# Patient Record
Sex: FEMALE | Race: WHITE | NOT HISPANIC OR LATINO | Employment: OTHER | URBAN - METROPOLITAN AREA
[De-identification: names, ages, dates, MRNs, and addresses within clinical notes are randomized per-mention and may not be internally consistent; named-entity substitution may affect disease eponyms.]

---

## 2017-01-04 ENCOUNTER — RADIANT APPOINTMENT (OUTPATIENT)
Dept: GENERAL RADIOLOGY | Facility: CLINIC | Age: 72
End: 2017-01-04
Attending: FAMILY MEDICINE
Payer: COMMERCIAL

## 2017-01-04 ENCOUNTER — RADIANT APPOINTMENT (OUTPATIENT)
Dept: ULTRASOUND IMAGING | Facility: CLINIC | Age: 72
End: 2017-01-04
Attending: FAMILY MEDICINE
Payer: COMMERCIAL

## 2017-01-04 ENCOUNTER — OFFICE VISIT (OUTPATIENT)
Dept: FAMILY MEDICINE | Facility: CLINIC | Age: 72
End: 2017-01-04
Payer: COMMERCIAL

## 2017-01-04 VITALS
HEIGHT: 68 IN | HEART RATE: 52 BPM | DIASTOLIC BLOOD PRESSURE: 80 MMHG | BODY MASS INDEX: 26.52 KG/M2 | WEIGHT: 175 LBS | OXYGEN SATURATION: 97 % | SYSTOLIC BLOOD PRESSURE: 128 MMHG | RESPIRATION RATE: 19 BRPM | TEMPERATURE: 98 F

## 2017-01-04 DIAGNOSIS — M25.561 RIGHT KNEE PAIN, UNSPECIFIED CHRONICITY: ICD-10-CM

## 2017-01-04 DIAGNOSIS — M25.561 CHRONIC PAIN OF BOTH KNEES: ICD-10-CM

## 2017-01-04 DIAGNOSIS — M25.551 HIP PAIN, RIGHT: ICD-10-CM

## 2017-01-04 DIAGNOSIS — G89.29 CHRONIC PAIN OF BOTH KNEES: ICD-10-CM

## 2017-01-04 DIAGNOSIS — M25.562 CHRONIC PAIN OF BOTH KNEES: ICD-10-CM

## 2017-01-04 DIAGNOSIS — M25.561 RIGHT KNEE PAIN, UNSPECIFIED CHRONICITY: Primary | ICD-10-CM

## 2017-01-04 PROCEDURE — 99213 OFFICE O/P EST LOW 20 MIN: CPT | Performed by: FAMILY MEDICINE

## 2017-01-04 PROCEDURE — 73562 X-RAY EXAM OF KNEE 3: CPT | Mod: LT | Performed by: RADIOLOGY

## 2017-01-04 PROCEDURE — 93971 EXTREMITY STUDY: CPT | Mod: RT

## 2017-01-04 PROCEDURE — 73502 X-RAY EXAM HIP UNI 2-3 VIEWS: CPT | Performed by: RADIOLOGY

## 2017-01-04 RX ORDER — BACLOFEN 10 MG/1
10 TABLET ORAL 3 TIMES DAILY
Refills: 3 | COMMUNITY
Start: 2016-12-12 | End: 2018-10-11

## 2017-01-04 ASSESSMENT — PAIN SCALES - GENERAL: PAINLEVEL: SEVERE PAIN (6)

## 2017-01-04 NOTE — PROGRESS NOTES
SUBJECTIVE:                                                    Zayra iDop is a 71 year old female who presents to clinic today for the following health issues:    Joint Pain     Onset: October     Description:   Location: bilateral knees, right hurts worse  Character: Sharp and Dull ache    Intensity: moderate    Progression of Symptoms: worse    Accompanying Signs & Symptoms:  Other symptoms: radiation of pain to upper thigh   History:   Previous similar pain: YES      Precipitating factors:   Trauma or overuse: YES- fell up the stairs on the right knee    Alleviating factors:  Improved by: nothing       Therapies Tried and outcome: ibuprofen, tylenol          Problem list and histories reviewed & adjusted, as indicated.  Additional history: as documented      Zayra believes she has arthitis in her knee. She typically has knee pain in the Spring and Fall due to weather changes, however this pain has persisted with the right knee being worse than the left. She also notes she fell up the stairs up the stairs in November and hurt her right knee. She sat down and elevated her knee immediately as she was unable to walk due to the pain initially. Currently,the pain is also present above her knee and behind her knee. She has occasional hip pain and pain that radiates down her right leg.    Denies back pain, numbness and tingling of her leg, additional falls, family hx of blood clots. She has occasional weakness in her knee but has not fallen.     Additional Notes  -She has a cold. She saw a GI specialist at [facility] and was told she does not have acidic GERD. She was given Zantac for mild GERD. She was recently started on Baclofen for relaxation of the esophagus and is unsure if it is helping.   -She has had bursitis in her shoulder.           Patient Active Problem List   Diagnosis     Health Care Home     Breast cancer (H)     Family history of thyroid disease     Hyperlipidemia LDL goal <130     Vitamin D deficiency  "disease     Advance care planning     Chronic cough     Past Surgical History   Procedure Laterality Date     Appendectomy  1969     Lumpectomy breast  1999     right     Mastectomy  6/2010     left     Laparoscopy  1972     for infertility     Cataract iol, rt/lt       laser treatment following       Social History   Substance Use Topics     Smoking status: Never Smoker      Smokeless tobacco: Never Used     Alcohol Use: Yes      Comment: social, 2 drinks/week     Family History   Problem Relation Age of Onset     Breast Cancer Sister      Breast Cancer Sister      HEART DISEASE Sister 74     stents     Alzheimer Disease Father      Kidney Cancer Brother      Kidney     CANCER Mother      Hodgkins     Cardiovascular Brother      has Heart problems had a TIA          Current Outpatient Prescriptions   Medication Sig Dispense Refill     baclofen (LIORESAL) 10 MG tablet Take 10 mg by mouth 3 times daily  3     ranitidine (ZANTAC) 150 MG tablet Take 150 mg by mouth daily       Red Yeast Rice Extract (RED YEAST RICE PO)        megestrol (MEGACE) 20 MG tablet 1-3 tablets orally daily 100 tablet 12     Cholecalciferol (VITAMIN D) 2000 UNITS tablet Take 1 tablet by mouth At Bedtime.       Cholecalciferol (VITAMIN D-3) 5000 UNITS TABS Take  by mouth daily.       CALCIUM-MAGNESIUM-ZINC PO Take  by mouth. Calcium = 333 MG, D3 = 200 MG, Mag - 133 MG, Zinc = 5 MG       No Known Allergies    ROS:  Constitutional, HEENT, cardiovascular, pulmonary, gi and gu systems are negative, except as otherwise noted.    OBJECTIVE:                                                    /80 mmHg  Pulse 52  Temp(Src) 98  F (36.7  C) (Oral)  Resp 19  Ht 1.727 m (5' 8\")  Wt 79.379 kg (175 lb)  BMI 26.61 kg/m2  SpO2 97%  LMP 11/10/1986  Breastfeeding? No  Body mass index is 26.61 kg/(m^2).     GENERAL: healthy, alert and no distress  MS: Right Knee-No joint line tenderness, tender posterior popliteal space, no patellar apprehension, " Suresh neg. Anterior drawer-neg. Full ROM.    Hip-tender over trochanteric bursa,Full ROM but pain with internal rotation(this is replicating her outer leg pain).   NEURO: Normal gait, heel and tiptoe walking.   PSYCH: mentation appears normal, affect normal/bright        Diagnostic Test Results:  none      ASSESSMENT/PLAN:                                                        ICD-10-CM    1. Right knee pain, unspecified chronicity M25.561 XR Hip Right 2-3 Views     US Lower Extremity Venous Duplex Right   2. Chronic pain of both knees M25.561 XR Knee Bilateral 3 Views    M25.562     G89.29    3. Hip pain, right M25.551    Will start workup with ultrasound of RLE to rule out DVT and Baker's cyst. Will get X-rays of knees and hip. Suspect OA of both knees and hip along with trochanteric bursitis causing her sx's. Depending on imaging results will likely plan for PT and starting glucosamine. Consider also ortho referral for possible injection. Patient sent directly from the office to radiology for the u/s and imaging.     See Patient Instructions  Patient Instructions   Follow up for ultrasound.     Try Glucosamine for 2 months. If it is helping, continue long-term. If not effective, discontinue.           This document serves as a record of the services and decisions personally performed and made by Precious Blackwood MD. It was created on her behalf by Aisha Davis,a trained medical scribe. The creation of this document is based the provider's statements to the medical scribe.  Aisha Davis January 4, 2017 3:30 PM     Precious Blackwood MD  [facility]

## 2017-01-04 NOTE — PATIENT INSTRUCTIONS
Follow up for ultrasound.     Try Glucosamine for 2 months. If it is helping, continue long-term. If not effective, discontinue.

## 2017-01-04 NOTE — MR AVS SNAPSHOT
After Visit Summary   1/4/2017    Zayra Diop    MRN: [id number]           Patient Information     Date Of Birth          1945        Visit Information        Provider Department      1/4/2017 3:00 PM Precious Blackwood MD [facility]        Today's Diagnoses     Right knee pain, unspecified chronicity    -  1     Chronic pain of both knees         Hip pain, right           Care Instructions    Follow up for ultrasound.     Try Glucosamine for 2 months. If it is helping, continue long-term. If not effective, discontinue.           Follow-ups after your visit        Your next 10 appointments already scheduled     Jan 04, 2017  4:30 PM   US LOWER EXTREMITY VENOUS DUPLEX RIGHT with MGUS1, MG US TECH   [facility])    [id number] [place] [id number] [phone]           Please bring a list of your medicines (including vitamins, minerals and over-the-counter drugs). Also, tell your doctor about any allergies you may have. Wear comfortable clothes and leave your valuables at home.  You do not need to do anything special to prepare for your exam.  Please call the Imaging Department at your exam site with any questions.            Jan 04, 2017  5:00 PM   XR HIP RIGHT G/E 2 VIEWS with MGXR2   [facility])    [address] [place] [id number] [phone]           Please bring a list of your current medicines to your exam. (Include vitamins, minerals and over-thecounter medicines.) Leave your valuables at home.  Tell your doctor if there is a chance you may be pregnant.  You do not need to do anything special for this exam.            Jan 04, 2017  5:20 PM   XR KNEE BILATERAL 3 VIEWS with MGXR2   [facility])    [address] [address] [address] [phone]           Please bring a list of  your current medicines to your exam. (Include vitamins, minerals and over-thecounter medicines.) Leave your valuables at home.  Tell your doctor if there is a chance you may be pregnant.  You do not need to do anything special for this exam.              Future tests that were ordered for you today     Open Future Orders        Priority Expected Expires Ordered    US Lower Extremity Venous Duplex Right STAT  1/4/2018 1/4/2017    XR Hip Right 2-3 Views Routine 1/4/2017 1/4/2018 1/4/2017    XR Knee Bilateral 3 Views Routine 1/4/2017 1/4/2018 1/4/2017            Who to contact     If you have questions or need follow up information about today's clinic visit or your schedule please contact [facility] directly at [phone].  Normal or non-critical lab and imaging results will be communicated to you by MyChart, letter or phone within 4 business days after the clinic has received the results. If you do not hear from us within 7 days, please contact the clinic through shoplyhart or phone. If you have a critical or abnormal lab result, we will notify you by phone as soon as possible.  Submit refill requests through [facility] or call your pharmacy and they will forward the refill request to us. Please allow 3 business days for your refill to be completed.          Additional Information About Your Visit        shoplyhar[facility]arma Information     [facility] gives you secure access to your electronic health record. If you see a primary care provider, you can also send messages to your care team and make appointments. If you have questions, please call your primary care clinic.  If you do not have a primary care provider, please call [phone] and they will assist you.        Care EveryWhere ID     This is your Care EveryWhere ID. This could be used by other organizations to access your [facility] medical records  [id number]        Your Vitals Were     Pulse Temperature Respirations    52 98  F (36.7  C) (Oral) 19    Height  "BMI (Body Mass Index) Pulse Oximetry    5' 8\" (1.727 m) 26.61 kg/m2 97%    Last Period Breastfeeding?       11/10/1986 No        Blood Pressure from Last 3 Encounters:   01/04/17 128/80   04/26/16 162/87   03/24/16 171/93    Weight from Last 3 Encounters:   01/04/17 175 lb (79.379 kg)   03/24/16 190 lb 8 oz (86.41 kg)   03/01/16 192 lb 1.6 oz (87.136 kg)               Primary Care Provider Office Phone # Fax #    Precious Juliann Blackwood [phone]       [facility] [address] [place] [address] [address]  [facility] [address]        Thank you!     Thank you for choosing [facility]  for your care. Our goal is always to provide you with excellent care. Hearing back from our patients is one way we can continue to improve our services. Please take a few minutes to complete the written survey that you may receive in the mail after your visit with us. Thank you!             Your Updated Medication List - Protect others around you: Learn how to safely use, store and throw away your medicines at www.disposemymeds.org.          This list is accurate as of: 1/4/17  3:46 PM.  Always use your most recent med list.                   Brand Name Dispense Instructions for use    baclofen 10 MG tablet    LIORESAL     Take 10 mg by mouth 3 times daily       CALCIUM-MAGNESIUM-ZINC PO      Take  by mouth. Calcium = 333 MG, D3 = 200 MG, Mag - 133 MG, Zinc = 5 MG       megestrol 20 MG tablet    MEGACE    100 tablet    1-3 tablets orally daily       ranitidine 150 MG tablet    ZANTAC     Take 150 mg by mouth daily       RED YEAST RICE PO          * Vitamin D-3 5000 UNITS Tabs      Take  by mouth daily.       * vitamin D 2000 UNITS tablet      Take 1 tablet by mouth At Bedtime.       * Notice:  This list has 2 medication(s) that are the same as other medications prescribed for you. Read the directions carefully, and ask your doctor or other care provider to review them with you.      "

## 2017-01-04 NOTE — NURSING NOTE
"Chief Complaint   Patient presents with     Knee Pain       Initial /80 mmHg  Pulse 52  Temp(Src) 98  F (36.7  C) (Oral)  Resp 19  Ht 5' 8\" (1.727 m)  Wt 175 lb (79.379 kg)  BMI 26.61 kg/m2  SpO2 97%  LMP 11/10/1986  Breastfeeding? No Estimated body mass index is 26.61 kg/(m^2) as calculated from the following:    Height as of this encounter: 5' 8\" (1.727 m).    Weight as of this encounter: 175 lb (79.379 kg).  BP completed using cuff size: large    Gemini Miles MA       "

## 2017-01-05 ENCOUNTER — MYC MEDICAL ADVICE (OUTPATIENT)
Dept: FAMILY MEDICINE | Facility: CLINIC | Age: 72
End: 2017-01-05

## 2017-01-05 DIAGNOSIS — M25.551 HIP PAIN, RIGHT: ICD-10-CM

## 2017-01-05 DIAGNOSIS — M25.561 RIGHT KNEE PAIN, UNSPECIFIED CHRONICITY: Primary | ICD-10-CM

## 2017-01-09 ENCOUNTER — OFFICE VISIT (OUTPATIENT)
Dept: ORTHOPEDICS | Facility: CLINIC | Age: 72
End: 2017-01-09
Payer: COMMERCIAL

## 2017-01-09 VITALS
HEIGHT: 68 IN | HEART RATE: 58 BPM | OXYGEN SATURATION: 95 % | WEIGHT: 174 LBS | DIASTOLIC BLOOD PRESSURE: 70 MMHG | BODY MASS INDEX: 26.37 KG/M2 | SYSTOLIC BLOOD PRESSURE: 122 MMHG

## 2017-01-09 DIAGNOSIS — M76.891 ENTHESOPATHY OF RIGHT HIP REGION: Primary | ICD-10-CM

## 2017-01-09 DIAGNOSIS — M71.21: ICD-10-CM

## 2017-01-09 PROCEDURE — 20611 DRAIN/INJ JOINT/BURSA W/US: CPT | Mod: RT | Performed by: FAMILY MEDICINE

## 2017-01-09 PROCEDURE — 99214 OFFICE O/P EST MOD 30 MIN: CPT | Mod: 25 | Performed by: FAMILY MEDICINE

## 2017-01-09 ASSESSMENT — PAIN SCALES - GENERAL: PAINLEVEL: SEVERE PAIN (7)

## 2017-01-09 NOTE — PROGRESS NOTES
HISTORY OF PRESENT ILLNESS  Ms. Diop is a pleasant 71 year old year old female who presents to clinic today with right knee and hip pain.  She was referred by Dr. Yoko Iverson explains that her pain started after she fell up the stairs in November of this year. Points to her lateral right hip, shoots down to her knee.  Her right hip hurts on the lateral aspect, hurts with most motion.  Also hurts whnen getting up from a seated position.  She also has right knee pain that would wax and wane for some time, became markedly painful a couple months ago and won't go away.  Juliann had hip and knee xrays done last week.  This morning Zayra felt her toes go tingly and numb while lying down in bed.    Quality:  achy pain    Severity: moderate to severe  Timing: occurs intermittently  Associated signs & symptoms: none  Previous similar pain: no  Additional history: as documented    MEDICAL HISTORY  Patient Active Problem List   Diagnosis     Health Care Home     Breast cancer (H)     Family history of thyroid disease     Hyperlipidemia LDL goal <130     Vitamin D deficiency disease     Advance care planning     Chronic cough       Current Outpatient Prescriptions   Medication Sig Dispense Refill     baclofen (LIORESAL) 10 MG tablet Take 10 mg by mouth 3 times daily  3     ranitidine (ZANTAC) 150 MG tablet Take 150 mg by mouth daily       Red Yeast Rice Extract (RED YEAST RICE PO)        megestrol (MEGACE) 20 MG tablet 1-3 tablets orally daily 100 tablet 12     Cholecalciferol (VITAMIN D) 2000 UNITS tablet Take 1 tablet by mouth At Bedtime.       Cholecalciferol (VITAMIN D-3) 5000 UNITS TABS Take  by mouth daily.       CALCIUM-MAGNESIUM-ZINC PO Take  by mouth. Calcium = 333 MG, D3 = 200 MG, Mag - 133 MG, Zinc = 5 MG         No Known Allergies    Family History   Problem Relation Age of Onset     Breast Cancer Sister      Breast Cancer Sister      HEART DISEASE Sister 74     stents     Alzheimer Disease Father      Kidney  "Cancer Brother      Kidney     CANCER Mother      Hodgkins     Cardiovascular Brother      has Heart problems had a TIA        Additional medical/Social/Surgical histories reviewed in [facility] and updated as appropriate.     REVIEW OF SYSTEMS (1/9/2017)  10 point ROS of systems including Constitutional, Eyes, Respiratory, Cardiovascular, Gastroenterology, Genitourinary, Integumentary, Musculoskeletal, Psychiatric were all negative except for pertinent positives noted in my HPI.     PHYSICAL EXAM  Filed Vitals:    01/09/17 1017   BP: 122/70   Pulse: 58   Height: 1.727 m (5' 8\")   Weight: 78.926 kg (174 lb)   SpO2: 95%     General  - normal appearance, in no obvious distress  CV  - normal femoral pulse  Pulm  - normal respiratory pattern, non-labored  Musculoskeletal - right hip  - stance: normal gait without limp, no obvious leg length discrepancy  - inspection: no swelling or effusion,  normal bone and joint alignment, no obvious deformity  - palpation: tender over the greater trochanter  - ROM: pain with active abduction, normal and painless flexion, extension, IR, ER, adduction  - strength: 5/5 in all planes  - special tests:  (-) UMA  (-) FADIR  no pain with axial femoral load  Neuro  - no sensory or motor deficit, grossly normal coordination, normal muscle tone  Skin  - no ecchymosis, erythema, warmth, or induration, no obvious rash  Psych  - interactive, appropriate, normal mood and affect        ASSESSMENT & PLAN  Ms. Diop is a 71 year old year old female who is in the office today with right knee and hip pain.    I reviewed her hip and knee films in the room with her which read:    Impression:  1. No acute osseous abnormality identified.  2. Degenerative changes of right hip with preservation of joint space.  Degenerative changes of SI joint.  3. Enthesopathy of pelvis and trochanters.    4. Right hip CAM deformity.    5. Slight lateral subluxation of bilateral patella relative to lateral  femoral " trochlear.  6. Question of lateral femorotibial joint space narrowing of right  knee only appreciable on lateral view.    Juliann's pain may be related to trochanteric enthesopathy or lumbar radiculopathy.    I injected her right greater trochanteric bursa today (see procedure note) as a diagnostic / therapeutic injection.  I also referred her to PT.    Her right knee pain is less bothersome and likely secondary to popliteal cysts that were found on her recent ultrasound exam.  We discussed the pathophysiology of these and some common treatment strategies.  I recommend that Juliann wear a knee sleeve with exercise and monitor her pain.    I recommend that Zayra return to my office for a re-examination in 4-6 weeks.  If no improvement I'll obtain imaging of her lumbar spine.    It was a pleasure taking care of Zayra.        Steven Gilbert DO, [facility]        Trochanteric Hip Injection - Ultrasound Guided  The patient was informed of the risks and the benefits of the procedure and a written consent was signed.  The patient s right lateral hip was prepped with chlorhexidine in sterile fashion.   40 mg of triamcinolone suspension was drawn up into a 5 mL syringe with 2 mL of 1% lidocaine and 2 mL of 0.5% marcaine.  Injection was performed using sterile technique.  Under ultrasound guidance a 3.5-inch 25-gauge needle was used to enter the right javier-trochanteric tissue.  Needle placement was visualized and documented with ultrasound which was deemed necessary to ensure medication did not enter the tendon itself, which could potentially cause tendon damage.   Injection performed long axis to the probe with probe in short axis to the greater trochanter.  Expansion of the javier-trochanteric tissue was visualized under ultrasound upon injection.  Images were permanently stored for the patient's record.  There were no complications. The patient tolerated the procedure well. There was negligible bleeding.   The patient was instructed to ice  the hip upon leaving clinic and refrain from overuse over the next 3 days.   The patient was instructed to call or go to the emergency room with any unusual pain, swelling, redness, or if otherwise concerned.  A follow up appointment will be scheduled to evaluate response to the injection, and to assess range of motion and pain.  Kenalog: NDC [id number]

## 2017-01-09 NOTE — PATIENT INSTRUCTIONS
Thanks for coming today.  Ortho[facility] Medicine Clinic  [address] [place] [address]    To schedule future appointments in Ortho Clinic, you may call [phone].    To schedule ordered imaging by your provider:   Call Central Imaging [phone]    To schedule an injection ordered by your provider:  Call Central Imaging Injection scheduling line: [phone]  [email or web address] available online at:  [email or web address]    Please call if any further questions or concerns ([phone]).  Clinic hours 8 am to 5 pm.    Return to clinic (call) if symptoms worsen or fail to improve.

## 2017-01-09 NOTE — NURSING NOTE
"Zayra Diop's goals for this visit include: Find a solution for right hip and right knee pain.   She requests these members of her care team be copied on today's visit information: yes    PCP: Precious Blackwood    Referring Provider:  No referring provider defined for this encounter.    Chief Complaint   Patient presents with     Consult     Patient fell up the stairs in November 2016. Pain never subsided.      Knee right     Hip right       Initial /70 mmHg  Pulse 58  Ht 1.727 m (5' 8\")  Wt 78.926 kg (174 lb)  BMI 26.46 kg/m2  SpO2 95%  LMP 11/10/1986 Estimated body mass index is 26.46 kg/(m^2) as calculated from the following:    Height as of this encounter: 1.727 m (5' 8\").    Weight as of this encounter: 78.926 kg (174 lb).  BP completed using cuff size: regular    "

## 2017-01-18 ENCOUNTER — THERAPY VISIT (OUTPATIENT)
Dept: PHYSICAL THERAPY | Facility: CLINIC | Age: 72
End: 2017-01-18
Payer: COMMERCIAL

## 2017-01-18 DIAGNOSIS — M70.61 TROCHANTERIC BURSITIS OF RIGHT HIP: Primary | ICD-10-CM

## 2017-01-18 PROCEDURE — 97161 PT EVAL LOW COMPLEX 20 MIN: CPT | Mod: GP | Performed by: PHYSICAL THERAPIST

## 2017-01-18 PROCEDURE — 97035 APP MDLTY 1+ULTRASOUND EA 15: CPT | Mod: GP | Performed by: PHYSICAL THERAPIST

## 2017-01-18 PROCEDURE — 97110 THERAPEUTIC EXERCISES: CPT | Mod: GP | Performed by: PHYSICAL THERAPIST

## 2017-01-18 NOTE — PROGRESS NOTES
Subjective:                                       Pertinent medical history includes:  Cancer.  Medical allergies: no.  Other surgeries include:  Cancer surgery.  Current medications:  Other.  Current occupation is Retired.                                  Objective:    System    Physical Exam    General     ROS    Assessment/Plan:

## 2017-01-18 NOTE — PROGRESS NOTES
[facility] Initial Evaluation    Subjective:    Zayra Diop is a 71 year old female with a right hip condition.  Condition occurred with:  A fall/slip (fell as ascending stairs).  Condition occurred: at home.  This is a new condition  November 2016.    Received injection to trochanteric bursa Jan 9, 2017 which was beneficial for a few days. .    Patient reports pain:  Lateral.  Radiates to:  Knee.  Pain is described as aching and is intermittent and reported as 3/10.   Pain is the same all the time.  Symptoms are exacerbated by lying on extremity and relieved by NSAID's (injection).  Since onset symptoms are gradually improving.  Special tests:  X-ray.  Previous treatment includes physical therapy (neck).  There was moderate improvement following previous treatment.  General health as reported by patient is good.                  Barriers include:  None as reported by the patient.    Red flags:  None as reported by the patient.                      Objective:    Standing Alignment:        Lumbar:  Normal  Pelvic:  Normal  Hip:  Normal        Gait:    Gait Type:  Normal       Non-Weight Bearing:  normal             Flexibility/Screens:   Positive screens:  Hip    Lower Extremity:  Decreased left lower extremity flexibility:Piriformis and Hamstrings    Decreased right lower extremity flexibility:  Piriformis and Hamstrings                                                 Hip Evaluation  Hip PROM:  Hip PROM:  Left Hip:    Normal  Right Hip:  Normal (Mild pain reported end range hip flexion)                          Hip Strength:  : Fair Glut Med. : Fair Glut Med.                          Hip Special Testing:      Right hip negative for the following special tests:  [place]on; Fadir/Labrum or SLR    Hip Palpation:      Right hip tenderness present at:  Greater Trachanter; Piriformis and Bursa  Functional Testing:  not assessed                         General     ROS    Assessment/Plan:      Patient  is a 71 year old female with right side hip complaints.    Patient has the following significant findings with corresponding treatment plan.                  Diagnosis 1:  Right trochanteric bursitis (suspect right piriformis syndrome also)      Pain -  hot/cold therapy, US, manual therapy, self management, education and home program  Decreased ROM/flexibility - manual therapy, therapeutic exercise, therapeutic activity and home program  Decreased strength - therapeutic exercise, therapeutic activities and home program  Inflammation - cold therapy, US and self management/home program  Impaired muscle performance - neuro re-education and home program  Decreased function - therapeutic activities and home program    Therapy Evaluation Codes:   1) History comprised of:   Personal factors that impact the plan of care:      None.    Comorbidity factors that impact the plan of care are:      None.     Medications impacting care: None.  2) Examination of Body Systems comprised of:   Body structures and functions that impact the plan of care:      Hip.   Activity limitations that impact the plan of care are:      Sleeping and Laying down.  3) Clinical presentation characteristics are:   Stable/Uncomplicated.  4) Decision-Making    Low complexity using standardized patient assessment instrument and/or measureable assessment of functional outcome.  Cumulative Therapy Evaluation is: Low complexity.    Previous and current functional limitations:  (See Goal Flow Sheet for this information)    Short term and Long term goals: (See Goal Flow Sheet for this information)     Communication ability:  Patient appears to be able to clearly communicate and understand verbal and written communication and follow directions correctly.  Treatment Explanation - The following has been discussed with the patient:   RX ordered/plan of care  Anticipated outcomes  Possible risks and side effects  This patient would benefit from PT intervention to  resume normal activities.   Rehab potential is good.    Frequency:  1 X week, once daily  Duration:  for 6 weeks  Discharge Plan:  Achieve all LTG.  Independent in home treatment program.  Return to previous functional level by discharge.  Reach maximal therapeutic benefit.    Please refer to the daily flowsheet for treatment today, total treatment time and time spent performing 1:1 timed codes.

## 2017-02-01 ENCOUNTER — THERAPY VISIT (OUTPATIENT)
Dept: PHYSICAL THERAPY | Facility: CLINIC | Age: 72
End: 2017-02-01
Payer: COMMERCIAL

## 2017-02-01 DIAGNOSIS — M70.61 TROCHANTERIC BURSITIS OF RIGHT HIP: Primary | ICD-10-CM

## 2017-02-01 PROCEDURE — 97140 MANUAL THERAPY 1/> REGIONS: CPT | Mod: GP | Performed by: PHYSICAL THERAPIST

## 2017-02-01 PROCEDURE — 97110 THERAPEUTIC EXERCISES: CPT | Mod: GP | Performed by: PHYSICAL THERAPIST

## 2017-02-01 PROCEDURE — 97035 APP MDLTY 1+ULTRASOUND EA 15: CPT | Mod: GP | Performed by: PHYSICAL THERAPIST

## 2017-02-08 ENCOUNTER — THERAPY VISIT (OUTPATIENT)
Dept: PHYSICAL THERAPY | Facility: CLINIC | Age: 72
End: 2017-02-08
Payer: COMMERCIAL

## 2017-02-08 DIAGNOSIS — M70.61 TROCHANTERIC BURSITIS OF RIGHT HIP: Primary | ICD-10-CM

## 2017-02-08 PROCEDURE — 97110 THERAPEUTIC EXERCISES: CPT | Mod: GP | Performed by: PHYSICAL THERAPIST

## 2017-02-08 PROCEDURE — 97140 MANUAL THERAPY 1/> REGIONS: CPT | Mod: GP | Performed by: PHYSICAL THERAPIST

## 2017-02-08 PROCEDURE — 97035 APP MDLTY 1+ULTRASOUND EA 15: CPT | Mod: GP | Performed by: PHYSICAL THERAPIST

## 2017-02-08 NOTE — PROGRESS NOTES
Subjective:    HPI                    Objective:    System    Physical Exam    General     ROS    Assessment/Plan:      PROGRESS  REPORT    Progress reporting period is from 1-18-17 to 2-8-17.       SUBJECTIVE  Subjective changes noted by patient: Zayra reports less pain right lateral hip. She is now able to ly on right side to sleep w/o difficulty. Cheif c/o piriformis tightness bilaterally.     Current pain level is: 0/10.     Previous pain level was: 6/10.   Changes in function:  Yes (See Goal flowsheet attached for changes in current functional level)  Adverse reaction to treatment or activity: None    OBJECTIVE  Changes noted in objective findings: Right hip ROM and strength WNL. Full painfree trunk ROM. Zayra reports greater TTP left piriformis  than right. Denies TTP right greater trochanter.       ASSESSMENT/PLAN  Updated problem list and treatment plan: Diagnosis 1:  Right Greater Trochanteric Bursitis  Pain -  US, manual therapy, self management, education and home program  Decreased strength - therapeutic exercise, therapeutic activities and home program  Inflammation - cold therapy, US and self management/home program  Impaired muscle performance - neuro re-education and home program  Decreased function - therapeutic activities and home program  STG/LTGs have been met or progress has been made towards goals:  Yes (See Goal flow sheet completed today.)  Assessment of Progress: The patient's condition is improving.  Patient is meeting short term goals and is progressing towards long term goals.  Self Management Plans:  Patient is independent in a home treatment program.  Patient is independent in self management of symptoms.  I have re-evaluated this patient and find that the nature, scope, duration and intensity of the therapy is appropriate for the medical condition of the patient.  Zayra continues to require the following intervention to meet STG and LTG's:  PT    Recommendations:  This patient would  benefit from continued therapy (will only schedule appt in next month if symptoms resurface).     Frequency:  1 X week, once daily  Duration:  for 2 weeks        Please refer to the daily flowsheet for treatment today, total treatment time and time spent performing 1:1 timed codes.

## 2017-03-28 PROBLEM — M70.61 TROCHANTERIC BURSITIS OF RIGHT HIP: Status: RESOLVED | Noted: 2017-01-18 | Resolved: 2017-03-28

## 2017-06-10 ENCOUNTER — HEALTH MAINTENANCE LETTER (OUTPATIENT)
Age: 72
End: 2017-06-10

## 2017-08-02 ENCOUNTER — TRANSFERRED RECORDS (OUTPATIENT)
Dept: HEALTH INFORMATION MANAGEMENT | Facility: CLINIC | Age: 72
End: 2017-08-02

## 2017-09-07 ENCOUNTER — OFFICE VISIT (OUTPATIENT)
Dept: ORTHOPEDICS | Facility: CLINIC | Age: 72
End: 2017-09-07
Payer: COMMERCIAL

## 2017-09-07 VITALS — HEART RATE: 63 BPM | SYSTOLIC BLOOD PRESSURE: 112 MMHG | DIASTOLIC BLOOD PRESSURE: 60 MMHG | OXYGEN SATURATION: 97 %

## 2017-09-07 DIAGNOSIS — G89.29 CHRONIC RIGHT SHOULDER PAIN: Primary | ICD-10-CM

## 2017-09-07 DIAGNOSIS — M17.11 PRIMARY OSTEOARTHRITIS OF RIGHT KNEE: ICD-10-CM

## 2017-09-07 DIAGNOSIS — M25.511 CHRONIC RIGHT SHOULDER PAIN: Primary | ICD-10-CM

## 2017-09-07 PROCEDURE — 20610 DRAIN/INJ JOINT/BURSA W/O US: CPT | Mod: RT | Performed by: FAMILY MEDICINE

## 2017-09-07 PROCEDURE — 99214 OFFICE O/P EST MOD 30 MIN: CPT | Mod: 25 | Performed by: FAMILY MEDICINE

## 2017-09-07 RX ORDER — TRIAMCINOLONE ACETONIDE 40 MG/ML
40 INJECTION, SUSPENSION INTRA-ARTICULAR; INTRAMUSCULAR ONCE
Qty: 1 ML | Refills: 0 | OUTPATIENT
Start: 2017-09-07 | End: 2017-09-07

## 2017-09-07 ASSESSMENT — PAIN SCALES - GENERAL: PAINLEVEL: SEVERE PAIN (6)

## 2017-09-07 NOTE — MR AVS SNAPSHOT
After Visit Summary   2017    Zayra Diop    MRN: [id number]           Patient Information     Date Of Birth          1945        Visit Information        Provider Department      2017 8:40 AM Steven Gilbert DO [facility]        Today's Diagnoses     Chronic right shoulder pain    -  1    Primary osteoarthritis of right knee          Care Instructions    Thanks for coming today.  Ortho/Sports Medicine Clinic  [address] [place] [address]    To schedule future appointments in Ortho Clinic, you may call [phone].    To schedule ordered imaging by your provider:   Call Central Imaging [phone]    To schedule an injection ordered by your provider:  Call Central Imaging Injection scheduling line: [phone]  [employer] available online at:  [email or web address]    Please call if any further questions or concerns ([phone]).  Clinic hours 8 am to 5 pm.    Return to clinic (call) if symptoms worsen or fail to improve.            Follow-ups after your visit        Your next 10 appointments already scheduled     Sep 21, 2017  8:40 AM CDT   Return Visit with Steven Gilbert DO   [facility] ([facility]    [address] [place] [id number] [phone]              Who to contact     If you have questions or need follow up information about today's clinic visit or your schedule please contact [facility] directly at [phone].  Normal or non-critical lab and imaging results will be communicated to you by MyChart, letter or phone within 4 business days after the clinic has received the results. If you do not hear from us within 7 days, please contact the clinic through [employer] or phone. If you have a critical or abnormal lab result, we will notify you by phone as soon as possible.  Submit refill requests through [employer] or call your pharmacy and they will forward  the refill request to us. Please allow 3 business days for your refill to be completed.          Additional Information About Your Visit        [employer] Information     [employer] gives you secure access to your electronic health record. If you see a primary care provider, you can also send messages to your care team and make appointments. If you have questions, please call your primary care clinic.  If you do not have a primary care provider, please call [phone] and they will assist you.      [employer] is an electronic gateway that provides easy, online access to your medical records. With [employer], you can request a clinic appointment, read your test results, renew a prescription or communicate with your care team.     To access your existing account, please contact your [facility] or call [phone] for assistance.        Care EveryWhere ID     This is your Care EveryWhere ID. This could be used by other organizations to access your [facility] medical records  [id number]        Your Vitals Were     Pulse Last Period Pulse Oximetry             63 11/10/1986 97%          Blood Pressure from Last 3 Encounters:   09/07/17 112/60   01/09/17 122/70   01/04/17 128/80    Weight from Last 3 Encounters:   01/09/17 78.9 kg (174 lb)   01/04/17 79.4 kg (175 lb)   03/24/16 86.4 kg (190 lb 8 oz)              We Performed the Following     DRAIN/INJECT LARGE JOINT/BURSA          Today's Medication Changes          These changes are accurate as of: 9/7/17  9:59 AM.  If you have any questions, ask your nurse or doctor.               Start taking these medicines.        Dose/Directions    triamcinolone acetonide 40 MG/ML injection   Commonly known as:  KENALOG   Used for:  Primary osteoarthritis of right knee        Dose:  40 mg   1 mL (40 mg) by INTRA-ARTICULAR route once for 1 dose   Quantity:  1 mL   Refills:  0            Where to get your medicines      Some of these will need a paper  prescription and others can be bought over the counter.  Ask your nurse if you have questions.     You don't need a prescription for these medications     triamcinolone acetonide 40 MG/ML injection                Primary Care Provider Office Phone # Fax #    Precious Blackwood [phone] [address] [facility] [place] [address]  [facility] [address]        Equal Access to Services     [facility] Hospital[facility] : Hadii aad ku hadasho Soomaali, waaxda luqadaha, qaybta kaalmada adeegyada, waxay idiin hayaan adeeg brianna la'aan . So [facility] [phone].    ATENCIÓN: Si guzmanla espjacinto, tiene a pham disposición servicios gratuitos de asistencia lingüística. Joe al [phone].    We comply with applicable federal civil rights laws and [place] laws. We do not discriminate on the basis of race, color, national origin, age, disability sex, sexual orientation or gender identity.            Thank you!     Thank you for choosing [facility]  for your care. Our goal is always to provide you with excellent care. Hearing back from our patients is one way we can continue to improve our services. Please take a few minutes to complete the written survey that you may receive in the mail after your visit with us. Thank you!             Your Updated Medication List - Protect others around you: Learn how to safely use, store and throw away your medicines at www.disposemymeds.org.          This list is accurate as of: 9/7/17  9:59 AM.  Always use your most recent med list.                   Brand Name Dispense Instructions for use Diagnosis    baclofen 10 MG tablet    LIORESAL     Take 10 mg by mouth 3 times daily        CALCIUM-MAGNESIUM-ZINC PO      Take  by mouth. Calcium = 333 MG, D3 = 200 MG, Mag - 133 MG, Zinc = 5 MG        megestrol 20 MG tablet    MEGACE    100 tablet    1-3 tablets orally daily    Hot flashes, Breast cancer (H)       ranitidine 150 MG tablet    ZANTAC     Take 150 mg by mouth daily        RED  YEAST RICE PO           triamcinolone acetonide 40 MG/ML injection    KENALOG    1 mL    1 mL (40 mg) by INTRA-ARTICULAR route once for 1 dose    Primary osteoarthritis of right knee       * Vitamin D-3 5000 UNITS Tabs      Take  by mouth daily.        * vitamin D 2000 UNITS tablet      Take 1 tablet by mouth At Bedtime.        * Notice:  This list has 2 medication(s) that are the same as other medications prescribed for you. Read the directions carefully, and ask your doctor or other care provider to review them with you.

## 2017-09-07 NOTE — PROGRESS NOTES
HISTORY OF PRESENT ILLNESS  Ms. Diop is a pleasant 71 year old year old female who presents to clinic today for follow up of her right hip pain.  Juliann's hip felt great until about 3 weeks ago.  Her knee is bothering her the most today.  I saw her for this in January.  This is getting worse.  Posterior and anterior knee, worse the more she's on her feet.   Also has right sided trapezius pain, present since she was in high school.  Tender to the touch, worse with palpation.  Doesn't shoot down the right arm, no numbness or tingling.  No weakness.  Quality: achy pain    Severity: moderate  Timing: occurs intermittently  Modifying factors: resting and non-use makes it better, movement and use makes it worse  Associated signs & symptoms: none  Previous similar pain: yes  Additional history: as documented      REVIEW OF SYSTEMS (9/7/2017)  10 point ROS of systems including Constitutional, Eyes, Respiratory, Cardiovascular, Gastroenterology, Genitourinary, Integumentary, Musculoskeletal, Psychiatric were all negative except for pertinent positives noted in my HPI.     PHYSICAL EXAM  Vitals:    09/07/17 0840   BP: 112/60   BP Location: Right arm   Patient Position: Chair   Cuff Size: Adult Regular   Pulse: 63   SpO2: 97%     General  - normal appearance, in no obvious distress  CV  - normal popliteal pulse  Pulm  - normal respiratory pattern, non-labored  Musculoskeletal - right knee  - stance: normal gait and station  - inspection: generalized swelling, posterior knee effusion  - palpation: medial joint line tenderness, patella and patellar tendon non-tender, normal popliteal pulse  - ROM: 135 degrees flexion, 0 degrees extension  - strength: 5/5 in flexion, 5/5 in extension  - neuro: no sensory or motor deficit  - special tests:  (-) Lachman  (-) varus at 0 and 30 degrees flexion  (-) valgus at 0 and 30 degrees flexion    Musculoskeletal - right shoulder  - inspection: normal bone and joint alignment, no obvious  deformity, no scapular winging, no AC step-off  - palpation: tender over right central to medial trapezius  - ROM:  180 deg flexion   45 deg extension   150 deg abduction   90 deg ER   70 deg IR  - strength: 5/5  strength, 5/5 in all shoulder planes  - special tests:  (-) Speed's  (-) Neer  (-) Hawkin's  (-) Shira  (-) [place]'s  (-) subscap lift-off    Neuro  - no sensory or motor deficit, grossly normal coordination, normal muscle tone  Skin  - no ecchymosis, erythema, warmth, or induration, no obvious rash  Psych  - interactive, appropriate, normal mood and affect            ASSESSMENT & PLAN  Ms. Diop is a 71 year old year old female who is in the office today following up with right hip pain.    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  I injected her knee today (see procedure note).    Juliann is going to follow up in 2 weeks, at that time we can consider a right trochanteric injection.    Her trapezius pain is likely muscular in nature, we discussed some home therapy she can do on her own.  She's going to let me know if this is a problem in the future.    It was a pleasure taking care of Greer Gilbert DO, CAM      PROCEDURE    Knee Injection - Intraarticular  The patient was informed of the risks and the benefits of the procedure and a written consent was signed.  The patient s right knee was prepped with chlorhexidine in sterile fashion.   40 mg of triamcinolone suspension was drawn up into a 5 mL syringe with 2 mL of 1% lidocaine and 2 mL of 0.5% marcaine.  Injection was performed using substerile technique.  A 1.5-inch 25-gauge needle was used to enter the lateral aspect of the right knee.  Injection performed successfully without difficulty.  There were no complications. The patient tolerated the procedure well. There was negligible bleeding.   The patient was instructed to ice the knee upon leaving clinic and refrain from overuse over the next 3 days.   The patient was  instructed to call or go to the emergency room with any unusual pain, swelling, redness, or if otherwise concerned.  A follow up appointment will be scheduled to evaluate response to the injection, and to assess range of motion and pain.  Kenalog: NDC 8970-1620-03

## 2017-09-07 NOTE — PATIENT INSTRUCTIONS
Thanks for coming today.  Ortho[facility]Sports Medicine Clinic  [address] [place] [address]    To schedule future appointments in Ortho Clinic, you may call [phone].    To schedule ordered imaging by your provider:   Call Central Imaging [phone]    To schedule an injection ordered by your provider:  Call Central Imaging Injection scheduling line: [phone]  [employer] available online at:  [email or web address]    Please call if any further questions or concerns ([phone]).  Clinic hours 8 am to 5 pm.    Return to clinic (call) if symptoms worsen or fail to improve.

## 2017-09-07 NOTE — NURSING NOTE
"Zayra Diop's goals for this visit include: Follow up with right hip, right knee and right shoulder pain.   She requests these members of her care team be copied on today's visit information: yes    PCP: Precious Blackwood    Referring Provider:  No referring provider defined for this encounter.    Chief Complaint   Patient presents with     RECHECK     Patient states that the pain is still the same     Knee right     Shoulder right       Initial /60 (BP Location: Right arm, Patient Position: Chair, Cuff Size: Adult Regular)  Pulse 63  LMP 11/10/1986  SpO2 97% Estimated body mass index is 26.46 kg/(m^2) as calculated from the following:    Height as of 1/9/17: 1.727 m (5' 8\").    Weight as of 1/9/17: 78.9 kg (174 lb).  Medication Reconciliation: complete    "

## 2017-09-21 ENCOUNTER — OFFICE VISIT (OUTPATIENT)
Dept: ORTHOPEDICS | Facility: CLINIC | Age: 72
End: 2017-09-21
Payer: COMMERCIAL

## 2017-09-21 VITALS — OXYGEN SATURATION: 97 % | SYSTOLIC BLOOD PRESSURE: 120 MMHG | HEART RATE: 54 BPM | DIASTOLIC BLOOD PRESSURE: 62 MMHG

## 2017-09-21 DIAGNOSIS — M67.959 TENDINOPATHY OF GLUTEAL REGION: Primary | ICD-10-CM

## 2017-09-21 PROCEDURE — 99213 OFFICE O/P EST LOW 20 MIN: CPT | Performed by: FAMILY MEDICINE

## 2017-09-21 ASSESSMENT — PAIN SCALES - GENERAL: PAINLEVEL: MODERATE PAIN (5)

## 2017-09-21 NOTE — MR AVS SNAPSHOT
After Visit Summary   2017    Zayra Diop    MRN: [id number]           Patient Information     Date Of Birth          1945        Visit Information        Provider Department      2017 8:40 AM Steven Gilbert, DO [facility]        Today's Diagnoses     Tendinopathy of gluteal region    -  1      Care Instructions    Thanks for coming today.  Ortho/Sports Medicine Clinic  [address] [place] [address]    To schedule future appointments in Ortho Clinic, you may call [phone].    To schedule ordered imaging by your provider:   Call Central Imaging [phone]    To schedule an injection ordered by your provider:  Call Central Imaging Injection scheduling line: [phone]  Grafflet available online at:  [email or web address]    Please call if any further questions or concerns ([phone]).  Clinic hours 8 am to 5 pm.    Return to clinic (call) if symptoms worsen or fail to improve.            Follow-ups after your visit        Additional Services     PHYSICAL THERAPY REFERRAL       Please eval and treat for right posterior hip pain                  Who to contact     If you have questions or need follow up information about today's clinic visit or your schedule please contact [facility] directly at [phone].  Normal or non-critical lab and imaging results will be communicated to you by MyChart, letter or phone within 4 business days after the clinic has received the results. If you do not hear from us within 7 days, please contact the clinic through MyChart or phone. If you have a critical or abnormal lab result, we will notify you by phone as soon as possible.  Submit refill requests through [employer] or call your pharmacy and they will forward the refill request to us. Please allow 3 business days for your refill to be completed.          Additional Information About Your Visit        MyChart Information      [employer] gives you secure access to your electronic health record. If you see a primary care provider, you can also send messages to your care team and make appointments. If you have questions, please call your primary care clinic.  If you do not have a primary care provider, please call [phone] and they will assist you.      [employer] is an electronic gateway that provides easy, online access to your medical records. With [employer], you can request a clinic appointment, read your test results, renew a prescription or communicate with your care team.     To access your existing account, please contact your [facility] or call [phone] for assistance.        Care EveryWhere ID     This is your Care EveryWhere ID. This could be used by other organizations to access your [facility] medical records  [id number]        Your Vitals Were     Pulse Last Period Pulse Oximetry             54 11/10/1986 97%          Blood Pressure from Last 3 Encounters:   09/21/17 120/62   09/07/17 112/60   01/09/17 122/70    Weight from Last 3 Encounters:   01/09/17 78.9 kg (174 lb)   01/04/17 79.4 kg (175 lb)   03/24/16 86.4 kg (190 lb 8 oz)              We Performed the Following     PHYSICAL THERAPY REFERRAL        Primary Care Provider Office Phone # Fax #    Precious Blackwood [phone] [address]  [facility] [address]        Equal Access to Services     [facility]: Hadii aad ku hadasho Soomaali, waaxda luqadaha, qaybta kaalmada adeegyada, matthew langston . So [facility] [phone].    ATENCIÓN: Si habla español, tiene a pham disposición servicios gratuitos de asistencia lingüística. Llame al [phone].    We comply with applicable federal civil rights laws and [place] laws. We do not discriminate on the basis of race, color, national origin, age, disability sex, sexual orientation or gender identity.            Thank you!     Thank you  for choosing [facility]  for your care. Our goal is always to provide you with excellent care. Hearing back from our patients is one way we can continue to improve our services. Please take a few minutes to complete the written survey that you may receive in the mail after your visit with us. Thank you!             Your Updated Medication List - Protect others around you: Learn how to safely use, store and throw away your medicines at www.disposemymeds.org.          This list is accurate as of: 9/21/17  9:07 AM.  Always use your most recent med list.                   Brand Name Dispense Instructions for use Diagnosis    baclofen 10 MG tablet    LIORESAL     Take 10 mg by mouth 3 times daily        CALCIUM-MAGNESIUM-ZINC PO      Take  by mouth. Calcium = 333 MG, D3 = 200 MG, Mag - 133 MG, Zinc = 5 MG        megestrol 20 MG tablet    MEGACE    100 tablet    1-3 tablets orally daily    Hot flashes, Breast cancer (H)       ranitidine 150 MG tablet    ZANTAC     Take 150 mg by mouth daily        RED YEAST RICE PO           * Vitamin D-3 5000 UNITS Tabs      Take  by mouth daily.        * vitamin D 2000 UNITS tablet      Take 1 tablet by mouth At Bedtime.        * Notice:  This list has 2 medication(s) that are the same as other medications prescribed for you. Read the directions carefully, and ask your doctor or other care provider to review them with you.

## 2017-09-21 NOTE — PATIENT INSTRUCTIONS
Thanks for coming today.  Ortho/Sports Medicine Clinic  [address] [place] [address]    To schedule future appointments in Ortho Clinic, you may call [phone].    To schedule ordered imaging by your provider:   Call Central Imaging [phone]    To schedule an injection ordered by your provider:  Call Central Imaging Injection scheduling line: [phone]  [employer] Inchart available online at:  [email or web address]    Please call if any further questions or concerns ([phone]).  Clinic hours 8 am to 5 pm.    Return to clinic (call) if symptoms worsen or fail to improve.

## 2017-09-21 NOTE — PROGRESS NOTES
HISTORY OF PRESENT ILLNESS  Ms. Diop is a pleasant 71 year old year old female following up with right hip pain.  Juliann continues to have right hip pain.  Points to her right glute.  Also has new onset of right big toe tingling and numbness.  Happened last week, was brief and intermittent, fortunately this went away.  Additional history: as documented      REVIEW OF SYSTEMS (9/21/2017)  10 point ROS of systems including Constitutional, Eyes, Respiratory, Cardiovascular, Gastroenterology, Genitourinary, Integumentary, Musculoskeletal, Psychiatric were all negative except for pertinent positives noted in my HPI.     PHYSICAL EXAM  Vitals:    09/21/17 0835   BP: 120/62   BP Location: Right arm   Patient Position: Chair   Cuff Size: Adult Regular   Pulse: 54   SpO2: 97%     General  - normal appearance, in no obvious distress  CV  - normal femoral pulse  Pulm  - normal respiratory pattern, non-labored  Musculoskeletal - right hip  - stance: normal gait without limp, normal single leg squat, no obvious leg length discrepancy, normal heel and toe walk  - inspection: no swelling or effusion, normal bone and joint alignment, no obvious deformity  - palpation: no lateral or anterior hip tenderness  - ROM: normal flexion, extension, IR, ER, abduction, adduction, not painful, no crepitus  - strength: 5/5 in all planes  - special tests:  (-) UMA  (-) FADIR  (-) straight leg raise  no pain with axial femoral load  Neuro  - no sensory or motor deficit, grossly normal coordination, normal muscle tone  Skin  - no ecchymosis, erythema, warmth, or induration, no obvious rash  Psych  - interactive, appropriate, normal mood and affect        ASSESSMENT & PLAN  Ms. Diop is a 71 year old year old female following up with right hip pain.    I again reviewed her hip xray in the room with her which shows gluteal calcifications.    Juliann's pain does seem more likely gluetal as opposed to bursal.  I gave Juliann a referral for physical  therapy.  She can apply ice for 10-15 minutes every 2-3 hours as needed.    We discussed working up her lumbar spine in the future, if ineffective.    I recommend that Zayra return to my office for a re-examination in 4-6 weeks.  She should follow up sooner if the condition worsens or if other problems arise.    It was a pleasure seeing Juliann.        Steven Gilbert, , CAM

## 2017-09-21 NOTE — NURSING NOTE
"Zayra JELANI Diop's goals for this visit include: Follow up with right hip pain  She requests these members of her care team be copied on today's visit information: yes    PCP: Precious Blackwood    Referring Provider:  ESTABLISHED PATIENT  No address on file    Chief Complaint   Patient presents with     RECHECK     Hip right     Patient states that the pain is coming back. Zayra last seen in clinic on 01/09/2017       Initial /62 (BP Location: Right arm, Patient Position: Chair, Cuff Size: Adult Regular)  Pulse 54  LMP 11/10/1986  SpO2 97% Estimated body mass index is 26.46 kg/(m^2) as calculated from the following:    Height as of 1/9/17: 1.727 m (5' 8\").    Weight as of 1/9/17: 78.9 kg (174 lb).  Medication Reconciliation: complete    "

## 2017-09-25 ENCOUNTER — THERAPY VISIT (OUTPATIENT)
Dept: PHYSICAL THERAPY | Facility: CLINIC | Age: 72
End: 2017-09-25
Payer: COMMERCIAL

## 2017-09-25 DIAGNOSIS — M25.551 HIP PAIN, RIGHT: Primary | ICD-10-CM

## 2017-09-25 PROCEDURE — 97161 PT EVAL LOW COMPLEX 20 MIN: CPT | Mod: GP | Performed by: PHYSICAL THERAPIST

## 2017-09-25 PROCEDURE — 97140 MANUAL THERAPY 1/> REGIONS: CPT | Mod: GP | Performed by: PHYSICAL THERAPIST

## 2017-09-25 PROCEDURE — 97112 NEUROMUSCULAR REEDUCATION: CPT | Mod: GP | Performed by: PHYSICAL THERAPIST

## 2017-09-26 NOTE — PROGRESS NOTES
Subjective:    Patient is a 71 year old female presenting with rehab left ankle/foot hpi.                                      Pertinent medical history includes:  Cancer.  Medical allergies: no.  Other surgeries include:  Cancer surgery.  Current medications:  None as reported by patient.  Current occupation is Retired  .                                    Objective:    System    Physical Exam    General     ROS    Assessment/Plan:

## 2017-10-02 ENCOUNTER — THERAPY VISIT (OUTPATIENT)
Dept: PHYSICAL THERAPY | Facility: CLINIC | Age: 72
End: 2017-10-02
Payer: COMMERCIAL

## 2017-10-02 DIAGNOSIS — M25.551 HIP PAIN, RIGHT: ICD-10-CM

## 2017-10-02 PROCEDURE — 97112 NEUROMUSCULAR REEDUCATION: CPT | Mod: GP | Performed by: PHYSICAL THERAPIST

## 2017-10-02 PROCEDURE — 97140 MANUAL THERAPY 1/> REGIONS: CPT | Mod: GP | Performed by: PHYSICAL THERAPIST

## 2017-10-09 ENCOUNTER — THERAPY VISIT (OUTPATIENT)
Dept: PHYSICAL THERAPY | Facility: CLINIC | Age: 72
End: 2017-10-09
Payer: COMMERCIAL

## 2017-10-09 DIAGNOSIS — M25.551 HIP PAIN, RIGHT: ICD-10-CM

## 2017-10-09 PROCEDURE — 97112 NEUROMUSCULAR REEDUCATION: CPT | Mod: GP | Performed by: PHYSICAL THERAPIST

## 2017-10-09 PROCEDURE — 97110 THERAPEUTIC EXERCISES: CPT | Mod: GP | Performed by: PHYSICAL THERAPIST

## 2017-10-09 PROCEDURE — 97140 MANUAL THERAPY 1/> REGIONS: CPT | Mod: GP | Performed by: PHYSICAL THERAPIST

## 2017-10-24 ENCOUNTER — THERAPY VISIT (OUTPATIENT)
Dept: PHYSICAL THERAPY | Facility: CLINIC | Age: 72
End: 2017-10-24
Payer: COMMERCIAL

## 2017-10-24 DIAGNOSIS — M25.551 HIP PAIN, RIGHT: ICD-10-CM

## 2017-10-24 PROCEDURE — 97140 MANUAL THERAPY 1/> REGIONS: CPT | Mod: GP | Performed by: PHYSICAL THERAPIST

## 2017-10-24 PROCEDURE — 97110 THERAPEUTIC EXERCISES: CPT | Mod: GP | Performed by: PHYSICAL THERAPIST

## 2017-10-24 PROCEDURE — 97112 NEUROMUSCULAR REEDUCATION: CPT | Mod: GP | Performed by: PHYSICAL THERAPIST

## 2017-10-24 NOTE — PROGRESS NOTES
Subjective:    HPI                    Objective:    System    Physical Exam    General     ROS    Assessment/Plan:      PROGRESS  REPORT    Progress reporting period is from 9/25/17 to 10/24/17. Progress report on 10/24/17 equal to discharge summary.     SUBJECTIVE  Subjective: Zayra reports overall functioning at 80%. Having most pain in low back upon waking in the morning. No longer having difficulty laying on R side at night to sleep. HEP is going well. Pt will continue with HEP independently at this time.    Current Pain level: 2/10   Initial Pain level: 6/10   Changes in function: Yes, see goal flow sheet for change in function   Adverse reactions: None;   ,         OBJECTIVE  Objective: Lumbar ROM: flexion to ankle, extension WNL -pain, R SB 75% +pain R, L SB WNL -pain, bilateral rotation WNL -pain. Moderate palpable tenderness remains R QL. Mild tenderness R trocanteric bursa and IT band. R glute max 4+/5 (5-/5 L) R glute med 4+/5 (5-/5 L)      ASSESSMENT/PLAN  Updated problem list and treatment plan: Diagnosis 1:  R hip pain, gluteal tendinopathy  Pain -  hot/cold therapy, manual therapy, self management, education and home program  Decreased ROM/flexibility - manual therapy, therapeutic exercise, therapeutic activity and home program  Decreased strength - therapeutic exercise, therapeutic activities and home program  Impaired muscle performance - neuro re-education and home program  Decreased function - therapeutic activities and home program  Impaired posture - neuro re-education, therapeutic activities and home program  STG/LTGs have been met or progress has been made towards goals:  Yes (See Goal flow sheet completed today.)  Assessment of Progress: The patient's condition is improving.  Self Management Plans:  Patient has been instructed in a home treatment program.  Patient is independent in a home treatment program.  Patient  has been instructed in self management of symptoms.  Patient is independent  in self management of symptoms.  I have re-evaluated this patient and find that the nature, scope, duration and intensity of the therapy is appropriate for the medical condition of the patient.  Zayra continues to require the following intervention to meet STG and LTG's: PT intervention is no longer required to meet STG/LTG.  We will discharge this patient from PT.    Recommendations:  This patient is ready to be discharged from therapy and continue their home treatment program.    Please refer to the daily flowsheet for treatment today, total treatment time and time spent performing 1:1 timed codes.

## 2017-12-05 PROBLEM — M25.551 HIP PAIN, RIGHT: Status: RESOLVED | Noted: 2017-09-25 | Resolved: 2017-12-05

## 2018-01-26 ENCOUNTER — TELEPHONE (OUTPATIENT)
Dept: FAMILY MEDICINE | Facility: CLINIC | Age: 73
End: 2018-01-26

## 2018-01-26 NOTE — TELEPHONE ENCOUNTER
[facility] form placed on Dr. Blackwood's desk for completion.      Cynthia BOOTH (R)(CHRISTINA)

## 2018-10-11 ENCOUNTER — OFFICE VISIT (OUTPATIENT)
Dept: FAMILY MEDICINE | Facility: CLINIC | Age: 73
End: 2018-10-11
Payer: COMMERCIAL

## 2018-10-11 VITALS
RESPIRATION RATE: 18 BRPM | HEART RATE: 62 BPM | SYSTOLIC BLOOD PRESSURE: 136 MMHG | OXYGEN SATURATION: 97 % | DIASTOLIC BLOOD PRESSURE: 84 MMHG | TEMPERATURE: 98.1 F

## 2018-10-11 DIAGNOSIS — R20.0 NUMBNESS AND TINGLING IN BOTH HANDS: Primary | ICD-10-CM

## 2018-10-11 DIAGNOSIS — R20.2 NUMBNESS AND TINGLING IN BOTH HANDS: Primary | ICD-10-CM

## 2018-10-11 LAB
ALBUMIN SERPL-MCNC: 4.1 G/DL (ref 3.4–5)
ALP SERPL-CCNC: 72 U/L (ref 40–150)
ALT SERPL W P-5'-P-CCNC: 35 U/L (ref 0–50)
ANION GAP SERPL CALCULATED.3IONS-SCNC: 6 MMOL/L (ref 3–14)
AST SERPL W P-5'-P-CCNC: 16 U/L (ref 0–45)
BASOPHILS # BLD AUTO: 0.1 10E9/L (ref 0–0.2)
BASOPHILS NFR BLD AUTO: 0.5 %
BILIRUB SERPL-MCNC: 0.4 MG/DL (ref 0.2–1.3)
BUN SERPL-MCNC: 17 MG/DL (ref 7–30)
CALCIUM SERPL-MCNC: 9.2 MG/DL (ref 8.5–10.1)
CHLORIDE SERPL-SCNC: 105 MMOL/L (ref 94–109)
CO2 SERPL-SCNC: 31 MMOL/L (ref 20–32)
CREAT SERPL-MCNC: 0.84 MG/DL (ref 0.52–1.04)
DIFFERENTIAL METHOD BLD: NORMAL
EOSINOPHIL # BLD AUTO: 0.1 10E9/L (ref 0–0.7)
EOSINOPHIL NFR BLD AUTO: 1.2 %
ERYTHROCYTE [DISTWIDTH] IN BLOOD BY AUTOMATED COUNT: 12.8 % (ref 10–15)
GFR SERPL CREATININE-BSD FRML MDRD: 66 ML/MIN/1.7M2
GLUCOSE SERPL-MCNC: 92 MG/DL (ref 70–99)
HCT VFR BLD AUTO: 42.8 % (ref 35–47)
HGB BLD-MCNC: 14.1 G/DL (ref 11.7–15.7)
LYMPHOCYTES # BLD AUTO: 3 10E9/L (ref 0.8–5.3)
LYMPHOCYTES NFR BLD AUTO: 33 %
MCH RBC QN AUTO: 31 PG (ref 26.5–33)
MCHC RBC AUTO-ENTMCNC: 32.9 G/DL (ref 31.5–36.5)
MCV RBC AUTO: 94 FL (ref 78–100)
MONOCYTES # BLD AUTO: 1 10E9/L (ref 0–1.3)
MONOCYTES NFR BLD AUTO: 10.8 %
NEUTROPHILS # BLD AUTO: 5 10E9/L (ref 1.6–8.3)
NEUTROPHILS NFR BLD AUTO: 54.5 %
PLATELET # BLD AUTO: 334 10E9/L (ref 150–450)
POTASSIUM SERPL-SCNC: 3.8 MMOL/L (ref 3.4–5.3)
PROT SERPL-MCNC: 7.7 G/DL (ref 6.8–8.8)
RBC # BLD AUTO: 4.55 10E12/L (ref 3.8–5.2)
SODIUM SERPL-SCNC: 142 MMOL/L (ref 133–144)
VIT B12 SERPL-MCNC: 404 PG/ML (ref 193–986)
WBC # BLD AUTO: 9.1 10E9/L (ref 4–11)

## 2018-10-11 PROCEDURE — 36415 COLL VENOUS BLD VENIPUNCTURE: CPT | Performed by: PHYSICIAN ASSISTANT

## 2018-10-11 PROCEDURE — 99214 OFFICE O/P EST MOD 30 MIN: CPT | Performed by: PHYSICIAN ASSISTANT

## 2018-10-11 PROCEDURE — 80053 COMPREHEN METABOLIC PANEL: CPT | Performed by: PHYSICIAN ASSISTANT

## 2018-10-11 PROCEDURE — 82607 VITAMIN B-12: CPT | Performed by: PHYSICIAN ASSISTANT

## 2018-10-11 PROCEDURE — 85025 COMPLETE CBC W/AUTO DIFF WBC: CPT | Performed by: PHYSICIAN ASSISTANT

## 2018-10-11 ASSESSMENT — PAIN SCALES - GENERAL: PAINLEVEL: NO PAIN (0)

## 2018-10-11 NOTE — PATIENT INSTRUCTIONS
I will notify you of lab results through [employer] as soon as available.   Schedule EMG at [facility] ([phone]).    Schedule follow up with neurology at [facility] ([phone]).            At [facility], we strive to deliver an exceptional experience to you, every time we see you.  If you receive a survey in the mail, please send us back your thoughts. We really do value your feedback.    Your care team:     Family Medicine   LUIS Quiles MD Emily Bunt, APRN CNP   S. MD Christelle Freed MD Angela Wermerskirchen, MD         Clinic hours: Monday - Wednesday 7 am-7 pm   Thursdays and Fridays 7 am-5 pm.     [facility]: Monday - Friday 11 am-9 pm,   Saturday and Sunday 9 am-5 pm.    [facility]: Monday -Thursday 8 am-8 pm; Friday 8 am-6 pm; Saturday and Sunday 9 am-5 pm.     [facility]: Monday - Thursday 8 am - 7 pm; Friday 8 am - 6 pm    Clinic: [phone]   [facility]: [phone]   [facility]: [phone]

## 2018-10-11 NOTE — MR AVS SNAPSHOT
After Visit Summary   10/11/2018    Zayra Diop    MRN: [id number]           Patient Information     Date Of Birth          1945        Visit Information        Provider Department      10/11/2018 1:20 PM Jo Banks PA-C [facility]        Today's Diagnoses     Numbness and tingling in both hands    -  1      Care Instructions    I will notify you of lab results through [employer] as soon as available.   Schedule EMG at [facility] ([phone]).    Schedule follow up with neurology at [facility] ([phone]).            At [facility], we strive to deliver an exceptional experience to you, every time we see you.  If you receive a survey in the mail, please send us back your thoughts. We really do value your feedback.    Your care team:     Family Medicine   LUIS Quiles MD Emily Bunt, GERONIMO COSBY   S. MD Christelle Freed MD Angela Wermerskirchen, MD         Clinic hours: Monday - Wednesday 7 am-7 pm   Thursdays and Fridays 7 am-5 pm.     [facility]: Monday - Friday 11 am-9 pm,   Saturday and Sunday 9 am-5 pm.    [facility]: Monday -Thursday 8 am-8 pm; Friday 8 am-6 pm; Saturday and Sunday 9 am-5 pm.     [facility]: Monday - Thursday 8 am - 7 pm; Friday 8 am - 6 pm    Clinic: [phone]   [facility]: [phone]   [facility]: [phone]                    Follow-ups after your visit        Additional Services     NEUROLOGY ADULT REFERRAL       Your provider has referred you for the following:   Consult at [facility]: [facility] [phone]   [email or web address]    Please be aware that coverage of these services is subject to the terms and limitations of your  health insurance plan.  Call member services at your health plan with any benefit or coverage questions.      Please bring the following with you to your appointment:    (1) Any X-Rays, CTs or MRIs which have been performed.  Contact the facility where they were done to arrange for  prior to your scheduled appointment.    (2) List of current medications  (3) This referral request   (4) Any documents/labs given to you for this referral                  Follow-up notes from your care team     Return in about 2 weeks (around 10/25/2018), or if symptoms worsen or fail to improve.      Future tests that were ordered for you today     Open Future Orders        Priority Expected Expires Ordered    EMG Routine  10/11/2019 10/11/2018            Who to contact     If you have questions or need follow up information about today's clinic visit or your schedule please contact [facility] directly at [phone].  Normal or non-critical lab and imaging results will be communicated to you by MyChart, letter or phone within 4 business days after the clinic has received the results. If you do not hear from us within 7 days, please contact the clinic through [employer] or phone. If you have a critical or abnormal lab result, we will notify you by phone as soon as possible.  Submit refill requests through [employer] or call your pharmacy and they will forward the refill request to us. Please allow 3 business days for your refill to be completed.          Additional Information About Your Visit        MyChart Information     [employer] gives you secure access to your electronic health record. If you see a primary care provider, you can also send messages to your care team and make appointments. If you have questions, please call your primary care clinic.  If you do not have a primary care provider, please call [phone] and they will assist you.        Care EveryWhere ID     This is your Care EveryWhere ID. This could  be used by other organizations to access your [facility] medical records  [id number]        Your Vitals Were     Pulse Temperature Respirations Last Period Pulse Oximetry Breastfeeding?    62 98.1  F (36.7  C) (Oral) 18 11/10/1986 (Exact Date) 97% No       Blood Pressure from Last 3 Encounters:   10/11/18 136/84   09/21/17 120/62   09/07/17 112/60    Weight from Last 3 Encounters:   01/09/17 78.9 kg (174 lb)   01/04/17 79.4 kg (175 lb)   03/24/16 86.4 kg (190 lb 8 oz)              We Performed the Following     CBC with platelets differential     Comprehensive metabolic panel     NEUROLOGY ADULT REFERRAL     Vitamin B12        Primary Care Provider Office Phone # Fax #    Precious Blackwood [phone] [address]  [facility] [address]        Equal Access to Services     [facility]: Hadii aad ku hadasho Soomaali, waaxda luqadaha, qaybta kaalmada adeegyada, waxay brettin hayaan cristiano langston . So [facility] [phone].    ATENCIÓN: Si habla español, tiene a pham disposición servicios gratuitos de asistencia lingüística. Llame al [phone].    We comply with applicable federal civil rights laws and [place] laws. We do not discriminate on the basis of race, color, national origin, age, disability, sex, sexual orientation, or gender identity.            Thank you!     Thank you for choosing [facility]  for your care. Our goal is always to provide you with excellent care. Hearing back from our patients is one way we can continue to improve our services. Please take a few minutes to complete the written survey that you may receive in the mail after your visit with us. Thank you!             Your Updated Medication List - Protect others around you: Learn how to safely use, store and throw away your medicines at www.disposemymeds.org.          This list is accurate as of 10/11/18  1:38 PM.  Always use your most recent med list.                   Brand Name  Dispense Instructions for use Diagnosis    NEW MED      Antronex

## 2018-10-11 NOTE — PROGRESS NOTES
"  SUBJECTIVE:   Zayra Diop is a 72 year old female who presents to clinic today for the following health issues:    Joint Pain    Onset: August    Description:   Location: bilateral arms  Character: Tingling    Intensity: moderate    Progression of Symptoms: worse    Accompanying Signs & Symptoms:  Other symptoms: numbness, tingling and pain when laying on left shoulder    History:   Previous similar pain: YES      Precipitating factors:   Trauma or overuse: no     Alleviating factors:  Improved by: nothing    Therapies Tried and outcome: advil      Complains of numbness and tingling Arms and hands since august.  Hurts to lie on left side.  Lay for so long and then moves due to symptoms .   Neck pain is related to my shoulder.  Went to therapy for neck last year.   advil without relief.    No prolonged periods at a desk or other possible trauma or injury  Is a .    Worse on the left.   No wrist pain.  Now in hands.  Tingling and different.  \"flowy\"    Chronic cough resolved with \"atronex \"- would like Dr. Blackwood to know      Problem list and histories reviewed & adjusted, as indicated.  Additional history: as documented    Patient Active Problem List   Diagnosis     Health Care Home     Breast cancer (H)     Family history of thyroid disease     Hyperlipidemia LDL goal <130     Vitamin D deficiency disease     Advance care planning     Chronic cough     Past Surgical History:   Procedure Laterality Date     APPENDECTOMY  1969     CATARACT IOL, RT/LT      laser treatment following     LAPAROSCOPY  1972    for infertility     LUMPECTOMY BREAST  1999    right     MASTECTOMY  6/2010    left       Social History   Substance Use Topics     Smoking status: Never Smoker     Smokeless tobacco: Never Used     Alcohol use Yes      Comment: social, 2 drinks/week     Family History   Problem Relation Age of Onset     Breast Cancer Sister      Breast Cancer Sister      HEART DISEASE Sister 74     " stents     Alzheimer Disease Father      Kidney Cancer Brother      Kidney     Cancer Mother      Hodgkins     Cardiovascular Brother      has Heart problems had a TIA          Current Outpatient Prescriptions   Medication Sig Dispense Refill     NEW MED Antronex       BP Readings from Last 3 Encounters:   10/11/18 136/84   09/21/17 120/62   09/07/17 112/60    Wt Readings from Last 3 Encounters:   01/09/17 78.9 kg (174 lb)   01/04/17 79.4 kg (175 lb)   03/24/16 86.4 kg (190 lb 8 oz)                    Reviewed and updated as needed this visit by clinical staff  Tobacco  Allergies  Meds  Problems  Med Hx  Surg Hx  Fam Hx  Soc Hx        Reviewed and updated as needed this visit by Provider  Tobacco  Allergies  Meds  Problems  Med Hx  Surg Hx  Fam Hx  Soc Hx          ROS:  Constitutional, HEENT, cardiovascular, pulmonary, gi and gu systems are negative, except as otherwise noted.    OBJECTIVE:     /84 (BP Location: Right arm, Patient Position: Chair, Cuff Size: Adult Regular)  Pulse 62  Temp 98.1  F (36.7  C) (Oral)  Resp 18  LMP 11/10/1986 (Exact Date)  SpO2 97%  Breastfeeding? No  There is no height or weight on file to calculate BMI.  GENERAL: healthy, alert and no distress  NECK: no adenopathy, no asymmetry, masses, or scars and thyroid normal to palpation  RESP: lungs clear to auscultation - no rales, rhonchi or wheezes  CV: regular rate and rhythm, normal S1 S2, no S3 or S4, no murmur, click or rub, no peripheral edema and peripheral pulses strong  ABDOMEN: soft, nontender, no hepatosplenomegaly, no masses and bowel sounds normal  MS: no gross musculoskeletal defects noted, no edema  phalen and tinel test negative.  Normal gross sensation both upper extremities normal  NEURO: Normal strength and tone, mentation intact and speech normal    Diagnostic Test Results:  Results for orders placed or performed in visit on 10/11/18   Vitamin B12   Result Value Ref Range    Vitamin B12 404 193 -  986 pg/mL   Comprehensive metabolic panel   Result Value Ref Range    Sodium 142 133 - 144 mmol/L    Potassium 3.8 3.4 - 5.3 mmol/L    Chloride 105 94 - 109 mmol/L    Carbon Dioxide 31 20 - 32 mmol/L    Anion Gap 6 3 - 14 mmol/L    Glucose 92 70 - 99 mg/dL    Urea Nitrogen 17 7 - 30 mg/dL    Creatinine 0.84 0.52 - 1.04 mg/dL    GFR Estimate 66 >60 mL/min/1.7m2    GFR Estimate If Black 80 >60 mL/min/1.7m2    Calcium 9.2 8.5 - 10.1 mg/dL    Bilirubin Total 0.4 0.2 - 1.3 mg/dL    Albumin 4.1 3.4 - 5.0 g/dL    Protein Total 7.7 6.8 - 8.8 g/dL    Alkaline Phosphatase 72 40 - 150 U/L    ALT 35 0 - 50 U/L    AST 16 0 - 45 U/L   CBC with platelets differential   Result Value Ref Range    WBC 9.1 4.0 - 11.0 10e9/L    RBC Count 4.55 3.8 - 5.2 10e12/L    Hemoglobin 14.1 11.7 - 15.7 g/dL    Hematocrit 42.8 35.0 - 47.0 %    MCV 94 78 - 100 fl    MCH 31.0 26.5 - 33.0 pg    MCHC 32.9 31.5 - 36.5 g/dL    RDW 12.8 10.0 - 15.0 %    Platelet Count 334 150 - 450 10e9/L    % Neutrophils 54.5 %    % Lymphocytes 33.0 %    % Monocytes 10.8 %    % Eosinophils 1.2 %    % Basophils 0.5 %    Absolute Neutrophil 5.0 1.6 - 8.3 10e9/L    Absolute Lymphocytes 3.0 0.8 - 5.3 10e9/L    Absolute Monocytes 1.0 0.0 - 1.3 10e9/L    Absolute Eosinophils 0.1 0.0 - 0.7 10e9/L    Absolute Basophils 0.1 0.0 - 0.2 10e9/L    Diff Method Automated Method        ASSESSMENT/PLAN:             1. Numbness and tingling in both hands  Rule out vitamin B12 deficiency. Rule out anemia and diabetes.  Rule out electrolyte abnormalities.  Evaluate with EMG and follow up with neurology  - Vitamin B12  - Comprehensive metabolic panel  - EMG; Future  - CBC with platelets differential  - NEUROLOGY ADULT REFERRAL    Patient Instructions     I will notify you of lab results through [facility] as soon as available.   Schedule EMG at [facility] ([phone]).    Schedule follow up with neurology at [facility]  [facility] ([phone]).            At [facility], we strive to deliver an exceptional experience to you, every time we see you.  If you receive a survey in the mail, please send us back your thoughts. We really do value your feedback.    Your care team:     Family Medicine   LUIS Quiles MD Emily Bunt, GERONIMO COSBY   S. MD Christelle Freed MD Angela Wermerskirchen, MD         Clinic hours: Monday - Wednesday 7 am-7 pm   Thursdays and Fridays 7 am-5 pm.     [facility]: Monday - Friday 11 am-9 pm,   Saturday and Sunday 9 am-5 pm.    [facility]: Monday -Thursday 8 am-8 pm; Friday 8 am-6 pm; Saturday and Sunday 9 am-5 pm.     [facility]: Monday - Thursday 8 am - 7 pm; Friday 8 am - 6 pm    Clinic: [phone]   [facility]: [phone]   [facility]: [phone]                 Jo Banks PA-C  [facility]

## 2018-10-12 NOTE — PROGRESS NOTES
Camilla Iverson  Your vitamin B12 level, electrolytes, blood sugar, kidney function and liver function were normal.   Your blood counts were normal.   Please call or MyChart my office with any questions or concerns.    Jo Banks, PAC

## 2018-11-06 ENCOUNTER — TRANSFERRED RECORDS (OUTPATIENT)
Dept: HEALTH INFORMATION MANAGEMENT | Facility: CLINIC | Age: 73
End: 2018-11-06

## 2018-12-17 ENCOUNTER — OFFICE VISIT (OUTPATIENT)
Dept: NEUROLOGY | Facility: CLINIC | Age: 73
End: 2018-12-17
Attending: PHYSICIAN ASSISTANT
Payer: COMMERCIAL

## 2018-12-17 DIAGNOSIS — R20.0 NUMBNESS AND TINGLING IN BOTH HANDS: ICD-10-CM

## 2018-12-17 DIAGNOSIS — R20.2 NUMBNESS AND TINGLING IN BOTH HANDS: ICD-10-CM

## 2018-12-17 PROCEDURE — 95911 NRV CNDJ TEST 9-10 STUDIES: CPT | Performed by: PSYCHIATRY & NEUROLOGY

## 2018-12-17 NOTE — LETTER
2018         RE: Zayra Diop  [address]  [place] [address]        Dear Colleague,    Thank you for referring your patient, Zayra Diop, to the [facility]. Please see a copy of my visit note below.    [facility]      Nerve Conduction & EMG Report          Patient:       Zayra Diop  Patient ID:    [id number]  Gender:        Female  YOB: 1945  Age:           73 Years 1 Months      History and Examination:  Zayra Diop is a 73 year old woman with itching in both hands. She denies loss of sensation or weakness. Several months ago she had some discomfort in the left upper limb which has now resolved. Examination demonstrates normal bulk and strength in the hands and normal biceps and patellar reflexes.    Techniques:  Motor conduction studies were done with surface recording electrodes. Sensory conduction studies were performed with surface electrodes, unless indicated otherwise by (n), designating the use of subdermal recording electrodes. Temperature was monitored and recorded throughout the study. Upper extremities were maintained at a temperature of 32 degrees Centigrade or higher. After discussion with the patient, needle electromyography was deferred.     Results:  Bilateral median and ulnar motor and sensory conduction studies demonstrated mild attenuation of the left ulnar sensory nerve action potential and were otherwise normal.     Interpretation:  This is a minimally abnormal nerve conduction study, demonstrating electrophysiologic evidence of the followin. Mild nonlocalizing left ulnar sensory neuropathy.  2. No evidence of polyneuropathy or entrapment neuropathy.    Comment:  If radiculopathy is strongly suspected clinically, needle electromyography can be performed at a later date.    Steven Chase MD          Sensory NCS      Nerve / Sites Rec. Site Onset Peak NP Amp Ref. PP Amp Dist Pb Ref.  Temp     ms ms  V  V  V cm m/s m/s  C   R MEDIAN - Dig II      Dig II Wrist 2.66 3.39 14.3 10.0 16.8 14 52.7 48.0 32.5   L MEDIAN - Dig II      Dig II Wrist 2.40 3.07 14.8 10.0 17.0 14 58.4 48.0 32.3   R ULNAR - Dig V      Dig V Wrist 2.19 3.07 8.9 8.0 10.3 12.5 57.1 48.0 32.2   L ULNAR - Dig V      Dig V Wrist 2.60 3.28 7.1 8.0 7.5 12.5 48.0 48.0 32.3      Palm Wrist 1.41 1.98 8.0  12.7 8 56.9  32.2       Motor NCS      Nerve / Sites Rec. Site Lat Ref. Amp Ref. Rel Amp Dist Pb Ref. Dur. Area Temp.     ms ms mV mV % cm m/s m/s ms %  C   R MEDIAN - APB      Wrist APB 3.33 4.40 8.6 5.0 100 8   5.78 100 32      Elbow APB 7.29  8.3  96.2 21.5 54.3 48.0 6.15 97.9 32   L MEDIAN - APB      Wrist APB 2.81 4.40 8.5 5.0 100 8   5.00 100 33.6      Elbow APB 6.93  8.5  100 22 53.5 48.0 5.16 101 33.8   R ULNAR - ADM      Wrist ADM 2.66 3.50 14.3 5.0 100 8   7.97 100 32.3      B.Elbow ADM 6.15  13.2  92.7 19.5 55.9 48.0 7.60 95.1 32.4      A.Elbow ADM 7.81  12.8  89.4 9 54.0 48.0 7.71 94.1 32.4   L ULNAR - ADM      Wrist ADM 2.81 3.50 13.4 5.0 100 8   6.20 100 33.1      B.Elbow ADM 6.61  12.4  92.6 20.5 53.9 48.0 6.30 101 33.1      A.Elbow ADM 8.28  11.2  83.7 9 54.0 48.0 6.35 93.3 33   L MEDIAN - II Lumb      Median II Lumb 2.97  2.2  100 10   7.24 100 33      Ulnar Palm Int 2.97  10.9  505 10   5.57 307 32.7                              Again, thank you for allowing me to participate in the care of your patient.        Sincerely,        Steven Chase MD

## 2018-12-17 NOTE — PROGRESS NOTES
[facility] EMG [facility]      Nerve Conduction & EMG Report          Patient:       Zayra Diop  Patient ID:    [id number]  Gender:        Female  YOB: 1945  Age:           73 Years 1 Months      History and Examination:  Zayra Diop is a 73 year old woman with itching in both hands. She denies loss of sensation or weakness. Several months ago she had some discomfort in the left upper limb which has now resolved. Examination demonstrates normal bulk and strength in the hands and normal biceps and patellar reflexes.    Techniques:  Motor conduction studies were done with surface recording electrodes. Sensory conduction studies were performed with surface electrodes, unless indicated otherwise by (n), designating the use of subdermal recording electrodes. Temperature was monitored and recorded throughout the study. Upper extremities were maintained at a temperature of 32 degrees Centigrade or higher. After discussion with the patient, needle electromyography was deferred.     Results:  Bilateral median and ulnar motor and sensory conduction studies demonstrated mild attenuation of the left ulnar sensory nerve action potential and were otherwise normal.     Interpretation:  This is a minimally abnormal nerve conduction study, demonstrating electrophysiologic evidence of the followin. Mild nonlocalizing left ulnar sensory neuropathy.  2. No evidence of polyneuropathy or entrapment neuropathy.    Comment:  If radiculopathy is strongly suspected clinically, needle electromyography can be performed at a later date.    Steven Chase MD          Sensory NCS      Nerve / Sites Rec. Site Onset Peak NP Amp Ref. PP Amp Dist Pb Ref. Temp     ms ms  V  V  V cm m/s m/s  C   R MEDIAN - Dig II      Dig II Wrist 2.66 3.39 14.3 10.0 16.8 14 52.7 48.0 32.5   L MEDIAN - Dig II      Dig II Wrist 2.40 3.07 14.8 10.0 17.0 14 58.4 48.0 32.3   R ULNAR - Dig V      Dig V Wrist 2.19 3.07 8.9  8.0 10.3 12.5 57.1 48.0 32.2   L ULNAR - Dig V      Dig V Wrist 2.60 3.28 7.1 8.0 7.5 12.5 48.0 48.0 32.3      Palm Wrist 1.41 1.98 8.0  12.7 8 56.9  32.2       Motor NCS      Nerve / Sites Rec. Site Lat Ref. Amp Ref. Rel Amp Dist Pb Ref. Dur. Area Temp.     ms ms mV mV % cm m/s m/s ms %  C   R MEDIAN - APB      Wrist APB 3.33 4.40 8.6 5.0 100 8   5.78 100 32      Elbow APB 7.29  8.3  96.2 21.5 54.3 48.0 6.15 97.9 32   L MEDIAN - APB      Wrist APB 2.81 4.40 8.5 5.0 100 8   5.00 100 33.6      Elbow APB 6.93  8.5  100 22 53.5 48.0 5.16 101 33.8   R ULNAR - ADM      Wrist ADM 2.66 3.50 14.3 5.0 100 8   7.97 100 32.3      B.Elbow ADM 6.15  13.2  92.7 19.5 55.9 48.0 7.60 95.1 32.4      A.Elbow ADM 7.81  12.8  89.4 9 54.0 48.0 7.71 94.1 32.4   L ULNAR - ADM      Wrist ADM 2.81 3.50 13.4 5.0 100 8   6.20 100 33.1      B.Elbow ADM 6.61  12.4  92.6 20.5 53.9 48.0 6.30 101 33.1      A.Elbow ADM 8.28  11.2  83.7 9 54.0 48.0 6.35 93.3 33   L MEDIAN - II Lumb      Median II Lumb 2.97  2.2  100 10   7.24 100 33      Ulnar Palm Int 2.97  10.9  505 10   5.57 307 32.7

## 2018-12-18 ENCOUNTER — TELEPHONE (OUTPATIENT)
Dept: FAMILY MEDICINE | Facility: CLINIC | Age: 73
End: 2018-12-18

## 2018-12-18 DIAGNOSIS — G56.20 ULNAR NEUROPATHY, UNSPECIFIED LATERALITY: Primary | ICD-10-CM

## 2018-12-18 NOTE — TELEPHONE ENCOUNTER
Notes recorded by Jo Banks PA-C on 12/18/2018 at 6:52 AM CST  Please call and advise that EMG suggests ulnar neuropathy- follow up with orthopedics for further recommendations - bk or mg - orders were placed    This writer attempted to contact Zayra on 12/18/18      Reason for call results and left detailed message.      If patient calls back:   Patient contacted by a Registered Nurse. Inform patient that someone from the RN group will contact them, document that pt called and route to P DYAD 3 RN POOL [[id number]]        Isabelle Kaur, RN

## 2018-12-18 NOTE — RESULT ENCOUNTER NOTE
Please call and advise that EMG suggests ulnar neuropathy- follow up with orthopedics for further recommendations - bk or mg - orders were placed

## 2018-12-19 NOTE — TELEPHONE ENCOUNTER
This writer attempted to contact Zayra on 12/19/18      Reason for call results and provider instructions  and left message.      If patient calls back:   Patient contacted by a Registered Nurse. Inform patient that someone from the RN group will contact them, document that pt called and route to P DYAD 3 RN POOL [[id number]]        Sabra Beltran RN

## 2018-12-19 NOTE — TELEPHONE ENCOUNTER
returned call    Best number to reach caller: Home number on file [phone] (home)    Is it ok to leave a detailed message: YES

## 2018-12-20 NOTE — TELEPHONE ENCOUNTER
returned call    Best number to reach caller: Home number on file [phone] (home)    Is it ok to leave a detailed message: YES

## 2018-12-20 NOTE — TELEPHONE ENCOUNTER
This writer attempted to contact Zayra on 12/20/18      Reason for call results/referral and left message.      If patient calls back:   Patient contacted by a Registered Nurse. Inform patient that someone from the RN group will contact them, document that pt called and route to P DYAD 3 RN POOL [[id number]]        Zaira Youngblood RN

## 2018-12-20 NOTE — TELEPHONE ENCOUNTER
Returned call to patient. Okay given to leave detailed VM message. This time writer left detailed VM message regarding results and referral information. Scheduling lines to [facility] clinic and [facility] clinic for Orthopedics consult given. Writer advised patient to please call clinic back to relay if received message or not and if has any further questions.    Sabra Beltran RN  [facility] Triage

## 2019-01-10 ENCOUNTER — OFFICE VISIT (OUTPATIENT)
Dept: ORTHOPEDICS | Facility: CLINIC | Age: 74
End: 2019-01-10
Payer: COMMERCIAL

## 2019-01-10 VITALS — HEART RATE: 51 BPM | SYSTOLIC BLOOD PRESSURE: 144 MMHG | DIASTOLIC BLOOD PRESSURE: 82 MMHG

## 2019-01-10 DIAGNOSIS — M54.12 CERVICAL RADICULOPATHY: Primary | ICD-10-CM

## 2019-01-10 PROCEDURE — 99213 OFFICE O/P EST LOW 20 MIN: CPT | Performed by: FAMILY MEDICINE

## 2019-01-10 RX ORDER — MELOXICAM 7.5 MG/1
TABLET ORAL
Refills: 1 | COMMUNITY
Start: 2018-11-26 | End: 2020-06-24

## 2019-01-10 RX ORDER — BENZONATATE 100 MG/1
100 CAPSULE ORAL
COMMUNITY
Start: 2016-01-11 | End: 2020-06-24

## 2019-01-10 RX ORDER — MEGESTROL ACETATE 20 MG/1
20 TABLET ORAL
COMMUNITY
End: 2020-06-24

## 2019-01-10 ASSESSMENT — PAIN SCALES - GENERAL: PAINLEVEL: NO PAIN (0)

## 2019-01-10 NOTE — LETTER
1/10/2019         RE: Zayra Diop  [address]  [place] [address]        Dear Colleague,    Thank you for referring your patient, Zayra Diop, to the [facility]. Please see a copy of my visit note below.    CHIEF COMPLAINT:  Consult (Bilateral hand numbness and tingling, reporting radiating numbness down both arm. )       HISTORY OF PRESENT ILLNESS  Ms. Diop is a pleasant 73 year old year old female who presents to clinic today with numbness and tingling in her arms and hand.  Juliann is seen at the request of Jo Banks.    Over the past few months Juliann has been experiencing worsening numbness and tingling in her arms.  This starts at her lateral shoulder and progresses down to her hand.  The sensations are worst in her hand, especially her left hand.  She does feel somewhat weak as well.  She also reports left-sided neck pain that is quite severe when it occurs.  She was recently seen by her primary care office for this and referred to neurology.  She had a recent EMG that she would like to review.    Of note, I have seen at [facility] for her neck in the past.  She has been to physical therapy and did well with this in the past.    Additional history: as documented    MEDICAL HISTORY  Patient Active Problem List   Diagnosis     Health Care Home     Breast cancer (H)     Family history of thyroid disease     Hyperlipidemia LDL goal <130     Vitamin D deficiency disease     Advance care planning     Chronic cough       Current Outpatient Medications   Medication Sig Dispense Refill     NEW MED Antronex         No Known Allergies    Family History   Problem Relation Age of Onset     Breast Cancer Sister      Breast Cancer Sister      Heart Disease Sister 74        stents     Alzheimer Disease Father      Kidney Cancer Brother         Kidney     Cancer Mother         Hodgkins     Cardiovascular Brother         has Heart problems had a TIA        Additional  medical/Social/Surgical histories reviewed in [facility] and updated as appropriate.     REVIEW OF SYSTEMS (1/10/2019)  CONSTITUTIONAL: Denies fever and weight loss  EYES: Denies acute vision changes  ENT: Denies hearing changes or difficulty swallowing  CARDIAC: Denies chest pain or edema  RESPIRATORY: Denies dyspnea, cough or wheeze  GASTROINTESTINAL: Denies abdominal pain, nausea, vomiting  MUSCULOSKELETAL: See HPI  SKIN: Denies any recent rash or lesion  NEUROLOGICAL: Denies numbness or focal weakness  PSYCHIATRIC: No history of psychiatric symptoms or problems  ENDOCRINE: Denies current diagnosis of diabetes  HEMATOLOGY: Denies episodes of easy bleeding      PHYSICAL EXAM  Vitals:    01/10/19 0723   BP: 144/82   Pulse: 51     General  - normal appearance, in no obvious distress  CV  - normal peripheral perfusion  Pulm  - normal respiratory pattern, non-labored  Musculoskeletal - upper extremities, cervical spine  - inspection: normal bone and joint alignment, no obvious kyphosis  - palpation: no paravertebral or bony tenderness  - ROM: pain with rotation and sidebending  - strength: upper extremities 5/5 in all planes, although objectively weaker with left   - special tests:  (-) Segundo's reflex  Neuro  - C5-7 DTRs 2+ bilaterally, no sensory or motor deficit, grossly normal coordination, normal muscle tone  Skin  - no ecchymosis, erythema, warmth, or induration, no obvious rash  Psych  - interactive, appropriate, normal mood and affect             ASSESSMENT & PLAN  Ms. Diop is a 73 year old year old female who presents to clinic today with numbness and tingling in both arms and hands.    I reviewed her EMG in the room with her which reads:  1. Mild nonlocalizing left ulnar sensory neuropathy.  2. No evidence of polyneuropathy or entrapment neuropathy.    I do have some suspicion that her symptoms may be arising from her source.    I am ordering an MRI of her cervical spine, she can get this done at her  earliest convenience.  I will get in touch with her via [employer] after her MRI.    Thank you for allowing me to participate in Juliann's care.    Steven Gilbert DO, [facility]  Primary Care Sports Medicine             Again, thank you for allowing me to participate in the care of your patient.        Sincerely,        Steven Gilbert DO

## 2019-01-10 NOTE — PROGRESS NOTES
CHIEF COMPLAINT:  Consult (Bilateral hand numbness and tingling, reporting radiating numbness down both arm. )       HISTORY OF PRESENT ILLNESS  Ms. Diop is a pleasant 73 year old year old female who presents to clinic today with numbness and tingling in her arms and hand.  Juliann is seen at the request of Jo Banks.    Over the past few months Juliann has been experiencing worsening numbness and tingling in her arms.  This starts at her lateral shoulder and progresses down to her hand.  The sensations are worst in her hand, especially her left hand.  She does feel somewhat weak as well.  She also reports left-sided neck pain that is quite severe when it occurs.  She was recently seen by her primary care office for this and referred to neurology.  She had a recent EMG that she would like to review.    Of note, I have seen at [facility] for her neck in the past.  She has been to physical therapy and did well with this in the past.    Additional history: as documented    MEDICAL HISTORY  Patient Active Problem List   Diagnosis     Health Care Home     Breast cancer (H)     Family history of thyroid disease     Hyperlipidemia LDL goal <130     Vitamin D deficiency disease     Advance care planning     Chronic cough       Current Outpatient Medications   Medication Sig Dispense Refill     NEW MED Antronex         No Known Allergies    Family History   Problem Relation Age of Onset     Breast Cancer Sister      Breast Cancer Sister      Heart Disease Sister 74        stents     Alzheimer Disease Father      Kidney Cancer Brother         Kidney     Cancer Mother         Hodgkins     Cardiovascular Brother         has Heart problems had a TIA        Additional medical/Social/Surgical histories reviewed in [facility] and updated as appropriate.     REVIEW OF SYSTEMS (1/10/2019)  CONSTITUTIONAL: Denies fever and weight loss  EYES: Denies acute vision changes  ENT: Denies hearing changes or difficulty swallowing  CARDIAC: Denies chest  pain or edema  RESPIRATORY: Denies dyspnea, cough or wheeze  GASTROINTESTINAL: Denies abdominal pain, nausea, vomiting  MUSCULOSKELETAL: See HPI  SKIN: Denies any recent rash or lesion  NEUROLOGICAL: Denies numbness or focal weakness  PSYCHIATRIC: No history of psychiatric symptoms or problems  ENDOCRINE: Denies current diagnosis of diabetes  HEMATOLOGY: Denies episodes of easy bleeding      PHYSICAL EXAM  Vitals:    01/10/19 0723   BP: 144/82   Pulse: 51     General  - normal appearance, in no obvious distress  CV  - normal peripheral perfusion  Pulm  - normal respiratory pattern, non-labored  Musculoskeletal - upper extremities, cervical spine  - inspection: normal bone and joint alignment, no obvious kyphosis  - palpation: no paravertebral or bony tenderness  - ROM: pain with rotation and sidebending  - strength: upper extremities 5/5 in all planes, although objectively weaker with left   - special tests:  (-) Segundo's reflex  Neuro  - C5-7 DTRs 2+ bilaterally, no sensory or motor deficit, grossly normal coordination, normal muscle tone  Skin  - no ecchymosis, erythema, warmth, or induration, no obvious rash  Psych  - interactive, appropriate, normal mood and affect             ASSESSMENT & PLAN  Ms. Diop is a 73 year old year old female who presents to clinic today with numbness and tingling in both arms and hands.    I reviewed her EMG in the room with her which reads:  1. Mild nonlocalizing left ulnar sensory neuropathy.  2. No evidence of polyneuropathy or entrapment neuropathy.    I do have some suspicion that her symptoms may be arising from her source.    I am ordering an MRI of her cervical spine, she can get this done at her earliest convenience.  I will get in touch with her via "[employer]hart after her MRI.    Thank you for allowing me to participate in Juliann's care.    Steven Gilbert DO, [facility]  Primary Care Sports Medicine

## 2019-01-10 NOTE — PATIENT INSTRUCTIONS
Thanks for coming today.  Ortho/Sports Medicine Clinic  [address] [place] [address]    To schedule future appointments in Ortho Clinic, you may call [phone].    To schedule ordered imaging by your provider:   Call Central Imaging [phone]    To schedule an injection ordered by your provider:  Call Central Imaging Injection scheduling line: [phone]  [employer] available online at:  [email or web address]    Please call if any further questions or concerns ([phone]).  Clinic hours 8 am to 5 pm.    Return to clinic (call) if symptoms worsen or fail to improve.

## 2019-01-10 NOTE — NURSING NOTE
Zayra Diop's chief complaint for this visit includes:  Chief Complaint   Patient presents with     Consult     Bilateral hand numbness and tingling, reporting radiating numbness down both arm.      PCP: Precious Blackwood    Referring Provider:  No referring provider defined for this encounter.    /82   Pulse 51   LMP 11/10/1986 (Exact Date)   No Pain (0)     Do you need any medication refills at today's visit? No

## 2019-01-15 ENCOUNTER — ANCILLARY PROCEDURE (OUTPATIENT)
Dept: MRI IMAGING | Facility: CLINIC | Age: 74
End: 2019-01-15
Payer: COMMERCIAL

## 2019-01-15 DIAGNOSIS — M54.12 CERVICAL RADICULOPATHY: ICD-10-CM

## 2019-01-15 PROCEDURE — 72141 MRI NECK SPINE W/O DYE: CPT | Performed by: RADIOLOGY

## 2019-02-14 ENCOUNTER — OFFICE VISIT (OUTPATIENT)
Dept: ORTHOPEDICS | Facility: CLINIC | Age: 74
End: 2019-02-14
Payer: COMMERCIAL

## 2019-02-14 VITALS
TEMPERATURE: 98.7 F | OXYGEN SATURATION: 96 % | HEART RATE: 68 BPM | SYSTOLIC BLOOD PRESSURE: 125 MMHG | DIASTOLIC BLOOD PRESSURE: 77 MMHG | RESPIRATION RATE: 18 BRPM

## 2019-02-14 DIAGNOSIS — M70.61 TROCHANTERIC BURSITIS, RIGHT HIP: Primary | ICD-10-CM

## 2019-02-14 PROCEDURE — 20611 DRAIN/INJ JOINT/BURSA W/US: CPT | Mod: RT | Performed by: FAMILY MEDICINE

## 2019-02-14 PROCEDURE — 99207 ZZC DROP WITH A PROCEDURE: CPT | Performed by: FAMILY MEDICINE

## 2019-02-14 RX ORDER — TRIAMCINOLONE ACETONIDE 40 MG/ML
40 INJECTION, SUSPENSION INTRA-ARTICULAR; INTRAMUSCULAR ONCE
Status: DISCONTINUED | OUTPATIENT
Start: 2019-02-14 | End: 2021-05-26

## 2019-02-14 ASSESSMENT — PAIN SCALES - GENERAL: PAINLEVEL: NO PAIN (0)

## 2019-02-14 NOTE — PATIENT INSTRUCTIONS
Thanks for coming today.  Ortho[facility]Sports Medicine Clinic  [address] [place] [address]    To schedule future appointments in Ortho Clinic, you may call [phone].    To schedule ordered imaging by your provider:   Call Central Imaging [phone]    To schedule an injection ordered by your provider:  Call Central Imaging Injection scheduling line: [phone]  [employer] available online at:  [email or web address]    Please call if any further questions or concerns ([phone]).  Clinic hours 8 am to 5 pm.    Return to clinic (call) if symptoms worsen or fail to improve.

## 2019-02-14 NOTE — PROGRESS NOTES
HISTORY OF PRESENT ILLNESS  Ms. Diop is a pleasant 73 year old year old female following up with right lateral hip pain.  Juliann last had a lateral hip injection in 2017.  She was doing great until a couple of weeks ago when her pain returned insidiously.  She points to her lateral right hip, worse with stairs, worse getting up from a chair.  She's interested in a repeat injection today.  Additional history: as documented      REVIEW OF SYSTEMS (2/14/2019)  10 point ROS of systems including Constitutional, Eyes, Respiratory, Cardiovascular, Gastroenterology, Genitourinary, Integumentary, Musculoskeletal, Psychiatric were all negative except for pertinent positives noted in my HPI.     PHYSICAL EXAM  Vitals:    02/14/19 1435   BP: 125/77   BP Location: Right arm   Patient Position: Sitting   Cuff Size: Adult Large   Pulse: 68   Resp: 18   Temp: 98.7  F (37.1  C)   TempSrc: Oral   SpO2: 96%     General  - normal appearance, in no obvious distress  CV  - normal femoral pulse  Pulm  - normal respiratory pattern, non-labored  Musculoskeletal - right hip  - stance: normal gait without limp  - inspection: no swelling or effusion, normal bone and joint alignment, no obvious deformity  - palpation: tender over the greater trochanter  - ROM: pain with active abduction, normal and painless flexion, extension, IR, ER, adduction  - strength: 5/5 in all planes  - special tests:  (-) UMA, elicits lateral hip pain  (-) FADIR  no pain with axial femoral load  Neuro  - no sensory or motor deficit, grossly normal coordination, normal muscle tone  Skin  - no ecchymosis, erythema, warmth, or induration, no obvious rash  Psych  - interactive, appropriate, normal mood and affect        ASSESSMENT & PLAN  Ms. Diop is a 73 year old year old female following up with right lateral hip pain.    I did repeat her injection today (see procedure note).  Juliann is going to resume her PT exercises.    We can follow up as needed for this and other  issues.    It was a pleasure seeing Juliann.    Trochanteric Hip Injection - Ultrasound Guided  The patient was informed of the risks and the benefits of the procedure and a written consent was signed.  The patient s right lateral hip was prepped with chlorhexidine in sterile fashion.   40 mg of triamcinolone suspension was drawn up into a 5 mL syringe with 2 mL of 1% lidocaine and 2 mL of 0.5% marcaine.  Injection was performed using sterile technique.  Under ultrasound guidance a 3.5-inch 25-gauge needle was used to enter the right javier-trochanteric tissue.  Needle placement was visualized and documented with ultrasound which was deemed necessary to ensure medication did not enter the tendon itself, which could potentially cause tendon damage.   Injection performed long axis to the probe with probe in short axis to the greater trochanter.  Expansion of the javier-trochanteric tissue was visualized under ultrasound upon injection.  Images were permanently stored for the patient's record.  There were no complications. The patient tolerated the procedure well. There was negligible bleeding.   The patient was instructed to ice the hip upon leaving clinic and refrain from overuse over the next 3 days.   The patient was instructed to call or go to the emergency room with any unusual pain, swelling, redness, or if otherwise concerned.   Kenalog: NDC 5255-8243-20                Steven Gilbert DO, CAQSM

## 2019-02-14 NOTE — LETTER
2/14/2019         RE: Zayra Diop  [address]  [place] [address]        Dear Colleague,    Thank you for referring your patient, Zayra Diop, to the [facility]. Please see a copy of my visit note below.    HISTORY OF PRESENT ILLNESS  Ms. Diop is a pleasant 73 year old year old female following up with right lateral hip pain.  Juliann last had a lateral hip injection in 2017.  She was doing great until a couple of weeks ago when her pain returned insidiously.  She points to her lateral right hip, worse with stairs, worse getting up from a chair.  She's interested in a repeat injection today.  Additional history: as documented      REVIEW OF SYSTEMS (2/14/2019)  10 point ROS of systems including Constitutional, Eyes, Respiratory, Cardiovascular, Gastroenterology, Genitourinary, Integumentary, Musculoskeletal, Psychiatric were all negative except for pertinent positives noted in my HPI.     PHYSICAL EXAM  Vitals:    02/14/19 1435   BP: 125/77   BP Location: Right arm   Patient Position: Sitting   Cuff Size: Adult Large   Pulse: 68   Resp: 18   Temp: 98.7  F (37.1  C)   TempSrc: Oral   SpO2: 96%     General  - normal appearance, in no obvious distress  CV  - normal femoral pulse  Pulm  - normal respiratory pattern, non-labored  Musculoskeletal - right hip  - stance: normal gait without limp  - inspection: no swelling or effusion, normal bone and joint alignment, no obvious deformity  - palpation: tender over the greater trochanter  - ROM: pain with active abduction, normal and painless flexion, extension, IR, ER, adduction  - strength: 5/5 in all planes  - special tests:  (-) UMA, elicits lateral hip pain  (-) FADIR  no pain with axial femoral load  Neuro  - no sensory or motor deficit, grossly normal coordination, normal muscle tone  Skin  - no ecchymosis, erythema, warmth, or induration, no obvious rash  Psych  - interactive, appropriate, normal mood and  affect        ASSESSMENT & PLAN  Ms. Diop is a 73 year old year old female following up with right lateral hip pain.    I did repeat her injection today (see procedure note).  Juliann is going to resume her PT exercises.    We can follow up as needed for this and other issues.    It was a pleasure seeing Juliann.    Trochanteric Hip Injection - Ultrasound Guided  The patient was informed of the risks and the benefits of the procedure and a written consent was signed.  The patient s right lateral hip was prepped with chlorhexidine in sterile fashion.   40 mg of triamcinolone suspension was drawn up into a 5 mL syringe with 2 mL of 1% lidocaine and 2 mL of 0.5% marcaine.  Injection was performed using sterile technique.  Under ultrasound guidance a 3.5-inch 25-gauge needle was used to enter the right javier-trochanteric tissue.  Needle placement was visualized and documented with ultrasound which was deemed necessary to ensure medication did not enter the tendon itself, which could potentially cause tendon damage.   Injection performed long axis to the probe with probe in short axis to the greater trochanter.  Expansion of the javier-trochanteric tissue was visualized under ultrasound upon injection.  Images were permanently stored for the patient's record.  There were no complications. The patient tolerated the procedure well. There was negligible bleeding.   The patient was instructed to ice the hip upon leaving clinic and refrain from overuse over the next 3 days.   The patient was instructed to call or go to the emergency room with any unusual pain, swelling, redness, or if otherwise concerned.   Kenalog: NDC 3611-1429-96                Steven Gilbert DO, CACHRISTINA          Again, thank you for allowing me to participate in the care of your patient.        Sincerely,        Steven Gilbert DO

## 2019-02-14 NOTE — NURSING NOTE
Zayra Diop's chief complaint for this visit includes:  Chief Complaint   Patient presents with     Right Hip - Follow Up     Requesting injection for hip bursitis  Ok per Mandie     PCP: Precious Blackwood    Referring Provider:  No referring provider defined for this encounter.    /77 (BP Location: Right arm, Patient Position: Sitting, Cuff Size: Adult Large)   Pulse 68   Temp 98.7  F (37.1  C) (Oral)   Resp 18   LMP 11/10/1986 (Exact Date)   SpO2 96%   No Pain (0)     Do you need any medication refills at today's visit? No    Efe Joyce CMA (AAMA)

## 2020-01-14 ENCOUNTER — ANCILLARY PROCEDURE (OUTPATIENT)
Dept: GENERAL RADIOLOGY | Facility: CLINIC | Age: 75
End: 2020-01-14
Attending: FAMILY MEDICINE
Payer: COMMERCIAL

## 2020-01-14 ENCOUNTER — OFFICE VISIT (OUTPATIENT)
Dept: ORTHOPEDICS | Facility: CLINIC | Age: 75
End: 2020-01-14
Payer: COMMERCIAL

## 2020-01-14 VITALS — SYSTOLIC BLOOD PRESSURE: 126 MMHG | DIASTOLIC BLOOD PRESSURE: 77 MMHG

## 2020-01-14 DIAGNOSIS — M17.0 BILATERAL PRIMARY OSTEOARTHRITIS OF KNEE: Primary | ICD-10-CM

## 2020-01-14 DIAGNOSIS — M17.0 BILATERAL PRIMARY OSTEOARTHRITIS OF KNEE: ICD-10-CM

## 2020-01-14 PROCEDURE — 20610 DRAIN/INJ JOINT/BURSA W/O US: CPT | Mod: 50 | Performed by: FAMILY MEDICINE

## 2020-01-14 PROCEDURE — 73564 X-RAY EXAM KNEE 4 OR MORE: CPT | Mod: LT | Performed by: RADIOLOGY

## 2020-01-14 PROCEDURE — 99212 OFFICE O/P EST SF 10 MIN: CPT | Mod: 25 | Performed by: FAMILY MEDICINE

## 2020-01-14 RX ORDER — TRIAMCINOLONE ACETONIDE 40 MG/ML
40 INJECTION, SUSPENSION INTRA-ARTICULAR; INTRAMUSCULAR
Status: DISCONTINUED | OUTPATIENT
Start: 2020-01-14 | End: 2021-05-26

## 2020-01-14 RX ADMIN — TRIAMCINOLONE ACETONIDE 40 MG: 40 INJECTION, SUSPENSION INTRA-ARTICULAR; INTRAMUSCULAR at 09:12

## 2020-01-14 NOTE — PROGRESS NOTES
HISTORY OF PRESENT ILLNESS  Ms. Diop is a pleasant 74 year old female following up with bilateral knee pain.  For the past month or so her knees have started to bother her more with no clear event.  She has noticed that her left knee is giving out at times as well.  This is mostly if she is climbing steps or standing for long periods of time.  The pain is mostly in the posterior aspects of each knee, again, left worse than right.  She has noticed some swelling.     PHYSICAL EXAM  Vitals:    01/14/20 0823   BP: 126/77     General  - normal appearance, in no obvious distress  CV  - normal popliteal pulse  Pulm  - normal respiratory pattern, non-labored  Musculoskeletal - right and left knee  - stance: antalgic gait favoring left leg  - inspection: trace effusions bilaterally  - palpation: left worse than right popliteal fullness and tenderness  - ROM: 135 degrees flexion, -5 degrees extension, not painful, normal actively and passively compared to contralateral  - strength: 5/5 in flexion, 5/5 in extension  - special tests:  (-) Lachman  (-) Suresh    Neuro  - no sensory or motor deficit, grossly normal coordination, normal muscle tone  Skin  - no ecchymosis, erythema, warmth, or induration, no obvious rash  Psych  - interactive, appropriate, normal mood and affect              ASSESSMENT & PLAN  Ms. Diop is a 74 year old female following up with bilateral knee pain.    I ordered & reviewed x-rays and compared them to her prior x-rays which we obtained 3 years ago.  This shows no significant change in her known osteoarthritis.  The OA appears moderate.    We revisited our discussion centering around her knee pain as a whole, she does have bilateral popliteal cysts that she is symptomatic from, although these are more than likely arising from her known osteoarthritis.    After some discussion, through shared decision making we did decide to inject her knees today (see procedure note).    If these injections do  not help with her symptoms I would consider MR imaging.    It was a pleasure seeing Juliann.    PROCEDURE    Knee Injection - Intraarticular  The patient was informed of the risks and the benefits of the procedure and a written consent was signed.  The patient s left knee was prepped with chlorhexidine in sterile fashion.   40 mg of triamcinolone suspension was drawn up into a 5 mL syringe with 4 mL of 1% lidocaine.  Injection was performed using substerile technique.  A 1.5-inch 22-gauge needle was used to enter the lateral aspect of the left knee.  Injection performed successfully without difficulty.  There were no complications. The patient tolerated the procedure well. There was negligible bleeding.   The patient s right knee was prepped with chlorhexidine in sterile fashion.   40 mg of triamcinolone suspension was drawn up into a 5 mL syringe with 4 mL of 1% lidocaine.  Injection was performed using substerile technique.  A 1.5-inch 22-gauge needle was used to enter the lateral aspect of the right knee.  Injection performed successfully without difficulty.  There were no complications. The patient tolerated the procedure well. There was negligible bleeding.   The patient was instructed to ice the knee upon leaving clinic and refrain from overuse over the next 3 days.   The patient was instructed to call or go to the emergency room with any unusual pain, swelling, redness, or if otherwise concerned.          Steven Gilbert DO, CAQSM      This note was constructed using Dragon dictation software, please excuse any minor errors in spelling, grammar, or syntax.        [facility] FOLLOW-UP VISIT 1/14/2020    Zayra Diop's chief complaint for this visit includes:  Chief Complaint   Patient presents with     RECHECK     est patient, new issue posterior knee pain and pain the goes down her left leg.      PCP: Precious Blackwood    Referring Provider:  Precious Blackwood MD  [address]  [place] [address]    /77   LMP 11/10/1986 (Exact Date)   Data Unavailable       Interval History:     Follow up reason: bilateral posterior knee pain      Medical History:    Any recent changes to your medical history? No    Any new medication prescribed since last visit? No    Review of Systems:    Do you have fever, chills, weight loss? No    Do you have any vision problems? No    Do you have any chest pain or edema? No    Do you have any shortness of breath or wheezing?  No    Do you have stomach problems? No    Do you have any numbness or focal weakness? No    Do you have diabetes? No    Do you have problems with bleeding or clotting? No    Do you have an rashes or other skin lesions? No    Large Joint Injection/Arthocentesis: bilateral knee  Date/Time: 1/14/2020 9:12 AM  Performed by: Steven Gilbert DO  Authorized by: Steven Gilbert DO     Indications:  Pain  Needle Size:  22 G  Guidance: landmark guided    Approach:  Lateral  Location:  Knee  Laterality:  Bilateral      Medications (Right):  40 mg triamcinolone 40 MG/ML  Medications (Left):  40 mg triamcinolone 40 MG/ML  Outcome:  Tolerated well, no immediate complications  Procedure discussed: discussed risks, benefits, and alternatives    Consent Given by:  Patient  Timeout: timeout called immediately prior to procedure    Prep: patient was prepped and draped in usual sterile fashion

## 2020-01-14 NOTE — LETTER
1/14/2020         RE: Zayra Diop  [address]  [facility] [address]        Dear Colleague,    Thank you for referring your patient, Zayra Diop, to the [facility]. Please see a copy of my visit note below.    HISTORY OF PRESENT ILLNESS  Ms. Diop is a pleasant 74 year old female following up with bilateral knee pain.  For the past month or so her knees have started to bother her more with no clear event.  She has noticed that her left knee is giving out at times as well.  This is mostly if she is climbing steps or standing for long periods of time.  The pain is mostly in the posterior aspects of each knee, again, left worse than right.  She has noticed some swelling.     PHYSICAL EXAM  Vitals:    01/14/20 0823   BP: 126/77     General  - normal appearance, in no obvious distress  CV  - normal popliteal pulse  Pulm  - normal respiratory pattern, non-labored  Musculoskeletal - right and left knee  - stance: antalgic gait favoring left leg  - inspection: trace effusions bilaterally  - palpation: left worse than right popliteal fullness and tenderness  - ROM: 135 degrees flexion, -5 degrees extension, not painful, normal actively and passively compared to contralateral  - strength: 5/5 in flexion, 5/5 in extension  - special tests:  (-) Lachman  (-) Suresh    Neuro  - no sensory or motor deficit, grossly normal coordination, normal muscle tone  Skin  - no ecchymosis, erythema, warmth, or induration, no obvious rash  Psych  - interactive, appropriate, normal mood and affect              ASSESSMENT & PLAN  Ms. Diop is a 74 year old female following up with bilateral knee pain.    I ordered & reviewed x-rays and compared them to her prior x-rays which we obtained 3 years ago.  This shows no significant change in her known osteoarthritis.  The OA appears moderate.    We revisited our discussion centering around her knee pain as a whole, she does have bilateral popliteal cysts  that she is symptomatic from, although these are more than likely arising from her known osteoarthritis.    After some discussion, through shared decision making we did decide to inject her knees today (see procedure note).    If these injections do not help with her symptoms I would consider MR imaging.    It was a pleasure seeing Juliann.    PROCEDURE    Knee Injection - Intraarticular  The patient was informed of the risks and the benefits of the procedure and a written consent was signed.  The patient s left knee was prepped with chlorhexidine in sterile fashion.   40 mg of triamcinolone suspension was drawn up into a 5 mL syringe with 4 mL of 1% lidocaine.  Injection was performed using substerile technique.  A 1.5-inch 22-gauge needle was used to enter the lateral aspect of the left knee.  Injection performed successfully without difficulty.  There were no complications. The patient tolerated the procedure well. There was negligible bleeding.   The patient s right knee was prepped with chlorhexidine in sterile fashion.   40 mg of triamcinolone suspension was drawn up into a 5 mL syringe with 4 mL of 1% lidocaine.  Injection was performed using substerile technique.  A 1.5-inch 22-gauge needle was used to enter the lateral aspect of the right knee.  Injection performed successfully without difficulty.  There were no complications. The patient tolerated the procedure well. There was negligible bleeding.   The patient was instructed to ice the knee upon leaving clinic and refrain from overuse over the next 3 days.   The patient was instructed to call or go to the emergency room with any unusual pain, swelling, redness, or if otherwise concerned.          Steven Gilbert DO, CAQSM      This note was constructed using Dragon dictation software, please excuse any minor errors in spelling, grammar, or syntax.        [facility] FOLLOW-UP VISIT 1/14/2020    Zayra Diop's chief complaint for this visit  includes:  Chief Complaint   Patient presents with     RECHECK     est patient, new issue posterior knee pain and pain the goes down her left leg.      PCP: Precious Blackwood    Referring Provider:  Precious Blackwood MD  [address]  [place] [address]    /77   LMP 11/10/1986 (Exact Date)   Data Unavailable       Interval History:     Follow up reason: bilateral posterior knee pain      Medical History:    Any recent changes to your medical history? No    Any new medication prescribed since last visit? No    Review of Systems:    Do you have fever, chills, weight loss? No    Do you have any vision problems? No    Do you have any chest pain or edema? No    Do you have any shortness of breath or wheezing?  No    Do you have stomach problems? No    Do you have any numbness or focal weakness? No    Do you have diabetes? No    Do you have problems with bleeding or clotting? No    Do you have an rashes or other skin lesions? No    Large Joint Injection/Arthocentesis: bilateral knee  Date/Time: 1/14/2020 9:12 AM  Performed by: Steven Gilbert DO  Authorized by: Steven Gilbert DO     Indications:  Pain  Needle Size:  22 G  Guidance: landmark guided    Approach:  Lateral  Location:  Knee  Laterality:  Bilateral      Medications (Right):  40 mg triamcinolone 40 MG/ML  Medications (Left):  40 mg triamcinolone 40 MG/ML  Outcome:  Tolerated well, no immediate complications  Procedure discussed: discussed risks, benefits, and alternatives    Consent Given by:  Patient  Timeout: timeout called immediately prior to procedure    Prep: patient was prepped and draped in usual sterile fashion              Again, thank you for allowing me to participate in the care of your patient.        Sincerely,        Steven Gilbert DO

## 2020-02-06 ENCOUNTER — TRANSFERRED RECORDS (OUTPATIENT)
Dept: HEALTH INFORMATION MANAGEMENT | Facility: CLINIC | Age: 75
End: 2020-02-06

## 2020-06-23 NOTE — PROGRESS NOTES
Subjective     Zayra Diop is a 74 year old female who presents to clinic today for the following health issues:    HPI   Breast concern  Patient with a history of Left breast mastectomy with sentinal lymph node removal and tissue expander placement 06/04/2009  and Right breast lumpectomy 1999  Presenting with a circular area of redness and swelling at the 12 o'clock region of her left breast. She denies tenderness, fever or chills.    -She would also like a skin tag removed at her right groin, present for years but catches onto cloths and hair.    Patient Active Problem List   Diagnosis     Health Care Home     Breast cancer (H)     Family history of thyroid disease     Hyperlipidemia LDL goal <130     Vitamin D deficiency disease     Advance care planning     Chronic cough     Past Surgical History:   Procedure Laterality Date     APPENDECTOMY  1969     CATARACT IOL, RT/LT      laser treatment following     LAPAROSCOPY  1972    for infertility     LUMPECTOMY BREAST  1999    right     MASTECTOMY  6/2010    left       Social History     Tobacco Use     Smoking status: Never Smoker     Smokeless tobacco: Never Used   Substance Use Topics     Alcohol use: Yes     Comment: social, 2 drinks/week     Family History   Problem Relation Age of Onset     Breast Cancer Sister      Breast Cancer Sister      Heart Disease Sister 74        stents     Alzheimer Disease Father      Kidney Cancer Brother         Kidney     Cancer Mother         Hodgkins     Cardiovascular Brother         has Heart problems had a TIA          Current Outpatient Medications   Medication Sig Dispense Refill     cephALEXin (KEFLEX) 500 MG capsule Take 1 capsule (500 mg) by mouth 3 times daily for 7 days 21 capsule 0     benzonatate (TESSALON) 100 MG capsule Take 100 mg by mouth       megestrol (MEGACE) 20 MG tablet Take 20 mg by mouth       meloxicam (MOBIC) 7.5 MG tablet TK 1 T PO  ONCE D. MAY TK 1 ADDITIONAL T PRN  1     NEW MED Antronex        Allergies   Allergen Reactions     Propofol Itching     Recent Labs   Lab Test 10/11/18  1339 09/12/13  0920  02/14/13  0800 02/14/13  0755 12/27/12  0731   LDL  --   --   --   --  181* 189*   HDL  --   --   --   --  55 58   TRIG  --   --   --   --  218* 276*   ALT 35  --   --  28  --   --    CR 0.84 0.77   < >  --   --   --    GFRESTIMATED 66 75   < >  --   --   --    GFRESTBLACK 80 >90   < >  --   --   --    POTASSIUM 3.8  --   --   --   --   --    TSH  --   --   --   --  3.98 4.76    < > = values in this interval not displayed.      BP Readings from Last 3 Encounters:   06/24/20 (!) 138/92   01/14/20 126/77   02/14/19 125/77    Wt Readings from Last 3 Encounters:   06/24/20 86.8 kg (191 lb 4.8 oz)   01/09/17 78.9 kg (174 lb)   01/04/17 79.4 kg (175 lb)                    Reviewed and updated as needed this visit by Provider  Med Hx  Surg Hx  Fam Hx         Review of Systems   Constitutional, HEENT, cardiovascular, pulmonary, GI, , musculoskeletal, neuro, skin, endocrine and psych systems are negative, except as otherwise noted.      Objective    BP (!) 138/92   Pulse (!) 49   Temp 98.5  F (36.9  C) (Tympanic)   Wt 86.8 kg (191 lb 4.8 oz)   LMP 11/10/1986 (Exact Date)   SpO2 96%   BMI 29.09 kg/m    Body mass index is 29.09 kg/m .  Physical Exam  Chest:            GENERAL: healthy, alert and no distress  NECK: no adenopathy, no asymmetry, masses, or scars and thyroid normal to palpation  RESP: lungs clear to auscultation - no rales, rhonchi or wheezes  CV: regular rate and rhythm, normal S1 S2, no S3 or S4, no murmur, click or rub, no peripheral edema and peripheral pulses strong  ABDOMEN: soft, nontender, no hepatosplenomegaly, no masses and bowel sounds normal  MS: no gross musculoskeletal defects noted, no edema  Skin tag skin of right groin.        Assessment & Plan     1. Lesion of breast  - MA Diagnostic Digital Bilateral; Future  - US Breast Left Complete 4 Quadrants; Future  - cephALEXin  (KEFLEX) 500 MG capsule; Take 1 capsule (500 mg) by mouth 3 times daily for 7 days  Dispense: 21 capsule; Refill: 0    2. History of left breast cancer  - MA Diagnostic Digital Bilateral; Future  - US Breast Left Complete 4 Quadrants; Future    3. H/O left breast implant  - MA Diagnostic Digital Bilateral; Future  - US Breast Left Complete 4 Quadrants; Future    4. Skin tags  Prior to the start of the procedure and with procedural staff participation, I verbally confirmed the patient s identity using two indicators, relevant allergies, that the procedure was appropriate and matched the consent or emergent situation, and that the correct equipment/implants were available. Immediately prior to starting the procedure I conducted the Time Out with the procedural staff and re-confirmed the patient s name, procedure, and site/side. (The Joint Commission universal protocol was followed.)  Yes    Sedation (Moderate or Deep): None  After spraying topical anaesthesia ( ethyl chloride), Using sterile iris scissors, multiple skin tags were snipped off at their bases after cleansing with Betadine.  Bleeding was controlled by pressure. Anesthetic was not required.  These pathognomonic benign lesions are not sent for pathological exam. The procedure was well tolerated. The patient will be alert for any signs of cutaneous   infection, and call if there are any problems.          No follow-ups on file.    Radha Puentes MD  [facility]

## 2020-06-24 ENCOUNTER — OFFICE VISIT (OUTPATIENT)
Dept: PEDIATRICS | Facility: CLINIC | Age: 75
End: 2020-06-24
Payer: COMMERCIAL

## 2020-06-24 VITALS
WEIGHT: 191.3 LBS | HEART RATE: 49 BPM | OXYGEN SATURATION: 96 % | BODY MASS INDEX: 29.09 KG/M2 | DIASTOLIC BLOOD PRESSURE: 92 MMHG | TEMPERATURE: 98.5 F | SYSTOLIC BLOOD PRESSURE: 138 MMHG

## 2020-06-24 DIAGNOSIS — N64.9 LESION OF BREAST: Primary | ICD-10-CM

## 2020-06-24 DIAGNOSIS — Z98.82 H/O LEFT BREAST IMPLANT: ICD-10-CM

## 2020-06-24 DIAGNOSIS — L91.8 SKIN TAG: ICD-10-CM

## 2020-06-24 DIAGNOSIS — Z85.3 HISTORY OF LEFT BREAST CANCER: ICD-10-CM

## 2020-06-24 PROCEDURE — 11200 RMVL SKIN TAGS UP TO&INC 15: CPT | Performed by: FAMILY MEDICINE

## 2020-06-24 PROCEDURE — 99213 OFFICE O/P EST LOW 20 MIN: CPT | Mod: 25 | Performed by: FAMILY MEDICINE

## 2020-06-24 RX ORDER — CEPHALEXIN 500 MG/1
500 CAPSULE ORAL 3 TIMES DAILY
Qty: 21 CAPSULE | Refills: 0 | Status: SHIPPED | OUTPATIENT
Start: 2020-06-24 | End: 2020-08-05

## 2020-07-01 ENCOUNTER — ANCILLARY PROCEDURE (OUTPATIENT)
Dept: MAMMOGRAPHY | Facility: CLINIC | Age: 75
End: 2020-07-01
Attending: FAMILY MEDICINE
Payer: COMMERCIAL

## 2020-07-01 ENCOUNTER — ANCILLARY PROCEDURE (OUTPATIENT)
Dept: ULTRASOUND IMAGING | Facility: CLINIC | Age: 75
End: 2020-07-01
Attending: FAMILY MEDICINE
Payer: COMMERCIAL

## 2020-07-01 DIAGNOSIS — N64.9 LESION OF BREAST: ICD-10-CM

## 2020-07-01 DIAGNOSIS — Z85.3 HISTORY OF LEFT BREAST CANCER: ICD-10-CM

## 2020-07-01 DIAGNOSIS — Z98.82 H/O LEFT BREAST IMPLANT: ICD-10-CM

## 2020-07-01 PROCEDURE — G0279 TOMOSYNTHESIS, MAMMO: HCPCS | Performed by: RADIOLOGY

## 2020-07-01 PROCEDURE — 77066 DX MAMMO INCL CAD BI: CPT | Performed by: RADIOLOGY

## 2020-07-01 PROCEDURE — 76642 ULTRASOUND BREAST LIMITED: CPT | Mod: LT | Performed by: RADIOLOGY

## 2020-07-02 DIAGNOSIS — R21 RASH: Primary | ICD-10-CM

## 2020-07-02 RX ORDER — CLOTRIMAZOLE AND BETAMETHASONE DIPROPIONATE 10; .64 MG/G; MG/G
CREAM TOPICAL 2 TIMES DAILY
Qty: 45 G | Refills: 0 | Status: SHIPPED | OUTPATIENT
Start: 2020-07-02 | End: 2020-08-05 | Stop reason: ALTCHOICE

## 2020-07-13 ENCOUNTER — MYC MEDICAL ADVICE (OUTPATIENT)
Dept: PEDIATRICS | Facility: CLINIC | Age: 75
End: 2020-07-13

## 2020-07-13 DIAGNOSIS — R21 RASH AND NONSPECIFIC SKIN ERUPTION: Primary | ICD-10-CM

## 2020-07-15 ENCOUNTER — TELEPHONE (OUTPATIENT)
Dept: DERMATOLOGY | Facility: CLINIC | Age: 75
End: 2020-07-15

## 2020-07-15 NOTE — TELEPHONE ENCOUNTER
Referral   Received: Yesterday   Message Contents   Latonia Peters sent to [facility] [place]               Patient has dermatology order from Dr Puentes for a rash. Per protocols, clinic to reach out to patient and schedule.   Routing to  Derm to connect with patient.        [id number], MEDICAL ASSISTANT

## 2020-08-05 ENCOUNTER — OFFICE VISIT (OUTPATIENT)
Dept: DERMATOLOGY | Facility: CLINIC | Age: 75
End: 2020-08-05
Payer: COMMERCIAL

## 2020-08-05 DIAGNOSIS — B35.4 TINEA CORPORIS: Primary | ICD-10-CM

## 2020-08-05 PROCEDURE — 99202 OFFICE O/P NEW SF 15 MIN: CPT | Performed by: PHYSICIAN ASSISTANT

## 2020-08-05 PROCEDURE — 87220 TISSUE EXAM FOR FUNGI: CPT | Performed by: PHYSICIAN ASSISTANT

## 2020-08-05 RX ORDER — KETOCONAZOLE 20 MG/G
CREAM TOPICAL
Qty: 30 G | Refills: 0 | Status: SHIPPED | OUTPATIENT
Start: 2020-08-05 | End: 2020-09-23

## 2020-08-05 ASSESSMENT — PAIN SCALES - GENERAL: PAINLEVEL: NO PAIN (0)

## 2020-08-05 NOTE — PROGRESS NOTES
Zayra Diop's goals for this visit include:   Chief Complaint   Patient presents with     Rash     left breast x 4 months. Red, not itchy or painful. Has implant in left breast. Was rx'd abx without relief. completed ultrasound and mammogram all NL.       She requests these members of her care team be copied on today's visit information: no    PCP: Precious Blackwood    Referring Provider:  No referring provider defined for this encounter.    LMP 11/10/1986 (Exact Date)     Do you need any medication refills at today's visit? No  Tanya Browne LPN

## 2020-08-05 NOTE — LETTER
8/5/2020         RE: Zayra Diop  [address]  [place] [address]        Dear Colleague,    Thank you for referring your patient, Zayra Diop, to the [facility]. Please see a copy of my visit note below.    Zayra Diop's goals for this visit include:   Chief Complaint   Patient presents with     Rash     left breast x 4 months. Red, not itchy or painful. Has implant in left breast. Was rx'd abx without relief. completed ultrasound and mammogram all NL.       She requests these members of her care team be copied on today's visit information: no    PCP: Precious Blackwood    Referring Provider:  No referring provider defined for this encounter.    LMP 11/10/1986 (Exact Date)     Do you need any medication refills at today's visit? No  Tanya Browne LPN        [facility] Dermatology Note      Dermatology Problem List:  1.Rash -tinea corporis - ketoconazole 2% cream BID x 10-14 days, KOH positive  2. SKs - back - moisturizers and OTC hydrocortisone 1% cream  3. Hx breast cancer x2, radiation to the left breast ~2000, lumpectomy of right breast 1999.    CC:   Chief Complaint   Patient presents with     Rash     left breast x 4 months. Red, not itchy or painful. Has implant in left breast. Was rx'd abx without relief. completed ultrasound and mammogram all NL.     Encounter Date: Aug 5, 2020    History of Present Illness:  Ms. Zayra Diop is a 74 year old female who presents in referral for a rash on the L breast. States she noticed a red area on the L breast about 4mo ago. Does not itch or give patient any sx. She wouldn't know it was there if she didn't see it she says. It gets more red after a shower. Started as a small round patch and has slowly grown. Has tried keflex pills, topical steroids without benefit and has tried clotrimazole/betmethasone cream on it and she used it for 4-5 days without benefit. Has had US and mammo and both were nml  she reports. Referred here by Dr. Radha Puentes. Hx of breast cancer x2. Notes she has a breast implant on the left. She had a lumpectomy on the R breast and a mastectomy on the L breast. Notes radiation to the R breast ~20 years ago. Additionally notes several borwn scaly things onher back that itch periodically, especially at night. Has not tried to treat them. She is otherwise well. She has no other concerns.    Past Medical History:   Patient Active Problem List   Diagnosis     Health Care Home     Breast cancer (H)     Family history of thyroid disease     Hyperlipidemia LDL goal <130     Vitamin D deficiency disease     Advance care planning     Chronic cough     Past Medical History:   Diagnosis Date     Breast cancer (H)      Infertility, female      Premature menopause age 42     Past Surgical History:   Procedure Laterality Date     APPENDECTOMY  1969     CATARACT IOL, RT/LT      laser treatment following     LAPAROSCOPY  1972    for infertility     LUMPECTOMY BREAST  1999    right     MASTECTOMY  6/2010    left       Social History:  Patient is a teacher    Family History:  There is no family history of skin cancer.    Medications:  Current Outpatient Medications   Medication Sig Dispense Refill     clotrimazole-betamethasone (LOTRISONE) 1-0.05 % external cream Apply topically 2 times daily (Patient not taking: Reported on 8/5/2020) 45 g 0     Allergies   Allergen Reactions     Propofol Itching     Review of Systems:  -Constitutional: The patient denies fatigue, fevers, chills, unintended weight loss, and night sweats.  -HEENT: Patient denies nonhealing oral sores.  -Skin: As above in HPI. No additional skin concerns.    Physical exam:  Vitals: LMP 11/10/1986 (Exact Date)   GEN: This is a well developed, well-nourished female in no acute distress, in a pleasant mood.    SKIN: Waist-up skin, which includes the head/face, neck, both arms, chest, back, abdomen, digits and/or nails was examined.  -There are  waxy stuck on tan to brown papules on the back.  -right breast - 12 oclock position - 3x3cm light pink patch with central clearing and a more pronounced border, minimal scaling  -No other lesions of concern on areas examined.     Impression/Plan:  1. Rash - tinea corporis - has been partially treated with clotrimazole/betamethasone cream x 4-5 days, oral keflex tried also without improvement.   -KOH positive  -start ketoconazole 2% cream BID on AA plus 1cm margin x10-14 days  -Follow up for biopsy if not improved in 1mo    2. Seborrheic keratosis, symptomatic - back  -reassured benign  -for associated itching recommended patient moisturize area daily  -ok to use small amount of OTC hydrocortisone 1% cream to the itching if it persists    CC Dr. Radha Puentes and Dr. Yao on close of this encounter.  Follow-up prn for new or changing lesions.      Staff Involved:  Staff Only  All risks, benefits and alternatives were discussed with patient.  Patient is in agreement and understands the assessment and plan.  All questions were answered.    Erika Navas PA-C, MPAS  [facility] [place]: Phone: [phone], Fax: [phone]  [facility]: Phone: [phone],  Fax: [phone]              Again, thank you for allowing me to participate in the care of your patient.        Sincerely,        Erika Navas PA-C

## 2020-08-06 LAB
KOH PREP SPEC: ABNORMAL
SPECIMEN SOURCE: ABNORMAL

## 2020-09-09 ENCOUNTER — OFFICE VISIT (OUTPATIENT)
Dept: DERMATOLOGY | Facility: CLINIC | Age: 75
End: 2020-09-09
Payer: COMMERCIAL

## 2020-09-09 DIAGNOSIS — R21 RASH: Primary | ICD-10-CM

## 2020-09-09 PROCEDURE — 11104 PUNCH BX SKIN SINGLE LESION: CPT | Performed by: PHYSICIAN ASSISTANT

## 2020-09-09 PROCEDURE — 88305 TISSUE EXAM BY PATHOLOGIST: CPT | Mod: TC | Performed by: PHYSICIAN ASSISTANT

## 2020-09-09 RX ORDER — LIDOCAINE HYDROCHLORIDE AND EPINEPHRINE 10; 10 MG/ML; UG/ML
3 INJECTION, SOLUTION INFILTRATION; PERINEURAL ONCE
Status: DISCONTINUED | OUTPATIENT
Start: 2020-09-09 | End: 2021-05-26

## 2020-09-09 ASSESSMENT — PAIN SCALES - GENERAL: PAINLEVEL: NO PAIN (0)

## 2020-09-09 NOTE — NURSING NOTE
Zayra Diop's goals for this visit include:   Chief Complaint   Patient presents with     Biopsy     Biopsy - left breast     She requests these members of her care team be copied on today's visit information: Yes    PCP: Precious Blackwood    Referring Provider:  No referring provider defined for this encounter.    LMP 11/10/1986 (Exact Date)     Do you need any medication refills at today's visit? No    Areli Wild LPN

## 2020-09-09 NOTE — LETTER
9/9/2020         RE: Zayra Diop  [address]  [place] [address]        Dear Colleague,    Thank you for referring your patient, Zayra Diop, to the [facility]. Please see a copy of my visit note below.    [facility] Dermatology Note      Dermatology Problem List:  1.Rash of L breast - s/p bx 9/9/20, MIHIR positive 8/5/20  -s/p ketoconaozle 2% cream, oral keflex, clotrimazole/betamethasone cream, topical steroids (unknown) - all with no benefit  -hx breast cancer x 2, radiation to the R breast, implant on the L breast    CC:   Chief Complaint   Patient presents with     Biopsy     Biopsy - left breast     Encounter Date: Sep 9, 2020    History of Present Illness:  Ms. Zayra Diop is a 74 year old female who returns for a punch biopsy of a rash on the L breast. It has not responded to ketoconazole 2% cream x14 days, oral keflex, and clotrimazole/betamethasone cream x 4-5 days. Notes it really has not changed at all except it has spread a bit laterally. She notes a very faint itch. Still feels like things come and go. Becomes more pronounced after bathing. Initially looked like it was going away and getting better with the use of ketoconazole cream, but now it looks like it has spread.     Interval hx:  States she noticed a red area on the L breast about 5mo ago. Does not itch or give patient any sx. She wouldn't know it was there if she didn't see it she says. It gets more red after a shower. Started as a small round patch and has slowly grown. Has tried keflex pills, topical steroids without benefit and has tried clotrimazole/betmethasone cream on it and she used it for 4-5 days without benefit. Has had US and mammo and both were nml she reports. Referred here by Dr. Radha Puentes. Hx of breast cancer x2. Notes she has a breast implant on the left. She had a lumpectomy on the R breast and a mastectomy on the L breast. Notes radiation to the R breast  ~20 years ago. She is otherwise well. She has no other concerns.    Past Medical History:   Patient Active Problem List   Diagnosis     Health Care Home     Breast cancer (H)     Family history of thyroid disease     Hyperlipidemia LDL goal <130     Vitamin D deficiency disease     Advance care planning     Chronic cough     Past Medical History:   Diagnosis Date     Breast cancer (H)      Infertility, female      Premature menopause age 42     Past Surgical History:   Procedure Laterality Date     APPENDECTOMY  1969     CATARACT IOL, RT/LT      laser treatment following     LAPAROSCOPY  1972    for infertility     LUMPECTOMY BREAST  1999    right     MASTECTOMY  6/2010    left       Social History:  Patient is a teacher.    Family History:  There is no family history of skin cancer.    Medications:  Current Outpatient Medications   Medication Sig Dispense Refill     ketoconazole (NIZORAL) 2 % external cream Apply topically BID for 10-14 days. 30 g 0     Allergies   Allergen Reactions     Propofol Itching     Review of Systems:  -Heme/Lymph: no concerning bumps, no bleeding or bruising problems   -Constitutional: The patient denies fatigue, fevers, chills, unintended weight loss, and night sweats.  -HEENT: Patient denies nonhealing oral sores.  -Skin: As above in HPI. No additional skin concerns.    Physical exam:  Vitals: LMP 11/10/1986 (Exact Date)   GEN: This is a well developed, well-nourished female in no acute distress, in a pleasant mood.    SKIN: Waist-up skin, which includes the head/face, neck, both arms, chest, back, abdomen, digits and/or nails was examined.  -right breast - 12 oclock position - 3x3cm light pink patch with central clearing and a more pronounced border, subtle induration.  It has now extended a bit laterally. No scaling on exam today.   -No other lesions of concern on areas examined.     Impression/Plan:  1. Rash - KOH positive 8/5/20 - partially tx with clotrimazole/betamethaosne cream x  4-5 days, oral keflex, as well as ketoconazole 2% cream x14 days without improvement. US and mammo both nml per patient. She does have a hx of breast cancer x2. DDx: GA vs deep fungal infection vs tinea vs dermatitis vs other  -Punch biopsy:  After discussion of benefits and risks including but not limited to bleeding/bruising, pain/swelling, infection, scar, incomplete removal, nerve damage/numbness, recurrence, and non-diagnostic biopsy, written consent, verbal consent and photographs were obtained. Time-out was performed. The area was cleaned with isopropyl alcohol. 0.5mL of 1% lidocaine with 1:100,000 epinephrine was injected to obtain adequate anesthesia of the lesion on the L breast.  A 4 mm punch biopsy was performed. 4-0 prolene sutures were utilized to approximate the epidermal edges. White petroleum jelly/Vaseline and a bandage was applied to the wound. Explicit verbal and written wound care instructions were provided. The patient left the Dermatology Clinic in good condition. The patient was counseled to follow up for suture removal in approximately 10-14 days.  -Photograph was obtained for clinical monitoring and inclusion in medical record.    CC Dr. Yao on close of this encounter.  Follow-up prn for new or changing lesions.     Staff Involved:  Staff Only  All risks, benefits and alternatives were discussed with patient.  Patient is in agreement and understands the assessment and plan.  All questions were answered.    Erika Navas PA-C, MPAS  [facility] [place]: Phone: [phone], Fax: [phone]  [facility]: Phone: [phone],  Fax: [phone]              The following medication was given:     MEDICATION:  Lidocaine with epinephrine 1% [id number]  ROUTE: SQ  SITE: see procedure note  DOSE: 1.5cc  LOT #: -[id number]  : Hospira  EXPIRATION DATE: 02/01/2021  NDC#: 8625-9948-50   Was there drug waste?  0.5cc  Multi-dose vial: Yes    Areli Wild, JUAN FRANCISCO  September 9, 2020        Again, thank you for allowing me to participate in the care of your patient.        Sincerely,        Erika Navas PA-C

## 2020-09-09 NOTE — PROGRESS NOTES
The following medication was given:     MEDICATION:  Lidocaine with epinephrine 1% 1:[id number]  ROUTE: SQ  SITE: see procedure note  DOSE: 1.5cc  LOT #: -EV  : Tami  EXPIRATION DATE: 02/01/2021  NDC#: 2035-9241-35   Was there drug waste? 0.5cc  Multi-dose vial: Yes    Areli iWld LPN  September 9, 2020

## 2020-09-09 NOTE — PATIENT INSTRUCTIONS

## 2020-09-09 NOTE — PROGRESS NOTES
[facility] Dermatology Note      Dermatology Problem List:  1.Rash of L breast - s/p bx 9/9/20, MIHIR positive 8/5/20  -s/p ketoconaozle 2% cream, oral keflex, clotrimazole/betamethasone cream, topical steroids (unknown) - all with no benefit  -hx breast cancer x 2, radiation to the R breast, implant on the L breast    CC:   Chief Complaint   Patient presents with     Biopsy     Biopsy - left breast     Encounter Date: Sep 9, 2020    History of Present Illness:  Ms. Zayra Diop is a 74 year old female who returns for a punch biopsy of a rash on the L breast. It has not responded to ketoconazole 2% cream x14 days, oral keflex, and clotrimazole/betamethasone cream x 4-5 days. Notes it really has not changed at all except it has spread a bit laterally. She notes a very faint itch. Still feels like things come and go. Becomes more pronounced after bathing. Initially looked like it was going away and getting better with the use of ketoconazole cream, but now it looks like it has spread.     Interval hx:  States she noticed a red area on the L breast about 5mo ago. Does not itch or give patient any sx. She wouldn't know it was there if she didn't see it she says. It gets more red after a shower. Started as a small round patch and has slowly grown. Has tried keflex pills, topical steroids without benefit and has tried clotrimazole/betmethasone cream on it and she used it for 4-5 days without benefit. Has had US and mammo and both were nml she reports. Referred here by Dr. Radha Puentes. Hx of breast cancer x2. Notes she has a breast implant on the left. She had a lumpectomy on the R breast and a mastectomy on the L breast. Notes radiation to the R breast ~20 years ago. She is otherwise well. She has no other concerns.    Past Medical History:   Patient Active Problem List   Diagnosis     Health Care Home     Breast cancer (H)     Family history of thyroid disease     Hyperlipidemia LDL goal <130      Vitamin D deficiency disease     Advance care planning     Chronic cough     Past Medical History:   Diagnosis Date     Breast cancer (H)      Infertility, female      Premature menopause age 42     Past Surgical History:   Procedure Laterality Date     APPENDECTOMY  1969     CATARACT IOL, RT/LT      laser treatment following     LAPAROSCOPY  1972    for infertility     LUMPECTOMY BREAST  1999    right     MASTECTOMY  6/2010    left       Social History:  Patient is a teacher.    Family History:  There is no family history of skin cancer.    Medications:  Current Outpatient Medications   Medication Sig Dispense Refill     ketoconazole (NIZORAL) 2 % external cream Apply topically BID for 10-14 days. 30 g 0     Allergies   Allergen Reactions     Propofol Itching     Review of Systems:  -Heme/Lymph: no concerning bumps, no bleeding or bruising problems   -Constitutional: The patient denies fatigue, fevers, chills, unintended weight loss, and night sweats.  -HEENT: Patient denies nonhealing oral sores.  -Skin: As above in HPI. No additional skin concerns.    Physical exam:  Vitals: LMP 11/10/1986 (Exact Date)   GEN: This is a well developed, well-nourished female in no acute distress, in a pleasant mood.    SKIN: Waist-up skin, which includes the head/face, neck, both arms, chest, back, abdomen, digits and/or nails was examined.  -right breast - 12 oclock position - 3x3cm light pink patch with central clearing and a more pronounced border, subtle induration.  It has now extended a bit laterally. No scaling on exam today.   -No other lesions of concern on areas examined.     Impression/Plan:  1. Rash - KOH positive 8/5/20 - partially tx with clotrimazole/betamethaosne cream x 4-5 days, oral keflex, as well as ketoconazole 2% cream x14 days without improvement. US and mammo both nml per patient. She does have a hx of breast cancer x2. DDx: GA vs deep fungal infection vs tinea vs dermatitis vs other  -Punch biopsy:  After  discussion of benefits and risks including but not limited to bleeding/bruising, pain/swelling, infection, scar, incomplete removal, nerve damage/numbness, recurrence, and non-diagnostic biopsy, written consent, verbal consent and photographs were obtained. Time-out was performed. The area was cleaned with isopropyl alcohol. 0.5mL of 1% lidocaine with 1:100,000 epinephrine was injected to obtain adequate anesthesia of the lesion on the L breast.  A 4 mm punch biopsy was performed. 4-0 prolene sutures were utilized to approximate the epidermal edges. White petroleum jelly/Vaseline and a bandage was applied to the wound. Explicit verbal and written wound care instructions were provided. The patient left the Dermatology Clinic in good condition. The patient was counseled to follow up for suture removal in approximately 10-14 days.  -Photograph was obtained for clinical monitoring and inclusion in medical record.    CC Dr. Yao on close of this encounter.  Follow-up prn for new or changing lesions.     Staff Involved:  Staff Only  All risks, benefits and alternatives were discussed with patient.  Patient is in agreement and understands the assessment and plan.  All questions were answered.    Erika Navas PA-C, MPAS  [facility] [place]: Phone: [phone], Fax: [phone]  [facility]: Phone: [phone],  Fax: [phone]

## 2020-09-09 NOTE — NURSING NOTE
Zayra Diop's goals for this visit include:   Chief Complaint   Patient presents with     Biopsy     Biopsy - right breast     She requests these members of her care team be copied on today's visit information: Yes    PCP: Precious Blackwood    Referring Provider:  No referring provider defined for this encounter.    LMP 11/10/1986 (Exact Date)     Do you need any medication refills at today's visit? No    Areli Wild LPN

## 2020-09-21 LAB — COPATH REPORT: NORMAL

## 2020-09-23 DIAGNOSIS — L71.8 GRANULOMATOUS ROSACEA: Primary | ICD-10-CM

## 2020-09-23 DIAGNOSIS — L92.0 GA (GRANULOMA ANNULARE): ICD-10-CM

## 2020-09-23 DIAGNOSIS — R23.4 CHANGES IN SKIN TEXTURE: ICD-10-CM

## 2020-09-23 RX ORDER — TRIAMCINOLONE ACETONIDE 1 MG/G
OINTMENT TOPICAL 2 TIMES DAILY
Qty: 30 G | Refills: 1 | Status: SHIPPED | OUTPATIENT
Start: 2020-09-23 | End: 2022-10-11

## 2020-09-24 ENCOUNTER — TELEPHONE (OUTPATIENT)
Dept: DERMATOLOGY | Facility: CLINIC | Age: 75
End: 2020-09-24

## 2020-09-24 DIAGNOSIS — L92.0 GA (GRANULOMA ANNULARE): ICD-10-CM

## 2020-09-24 DIAGNOSIS — R23.4 CHANGES IN SKIN TEXTURE: ICD-10-CM

## 2020-09-24 LAB — TSH SERPL DL<=0.005 MIU/L-ACNC: 2.09 MU/L (ref 0.4–4)

## 2020-09-24 PROCEDURE — 84443 ASSAY THYROID STIM HORMONE: CPT | Performed by: PHYSICIAN ASSISTANT

## 2020-09-24 PROCEDURE — 36415 COLL VENOUS BLD VENIPUNCTURE: CPT | Performed by: PHYSICIAN ASSISTANT

## 2020-09-24 NOTE — TELEPHONE ENCOUNTER
Malika Cui RN   9/24/2020 10:45 AM       RN noted Steroid ointment sent by Erika on 9/23 and also future TSH lab ordered. Pt called and notified. Pt questioned being on a steroid cream. RN notified pt that this is an ointment that Erika ordered but to call the clinic back if sx do not improve or worsen. Pt verbalized understanding and thanked RN for the call..Elvia Salazar RN, RN   9/22/2020 10:36 AM       Result reviewed with Zayra.  She states the rash is itchier now then when she was seen for the biopsy.  She'd like a prescription sent to [facility] in [place].       Patient is asking if she should have her thyroid checked.         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 She has a more uncommon type, called the interstitial type. This is a benign condition. We do not fully understand why it comes or what causes it. It tends to resolve on its own after a period of months, to sometimes years. If it is ithcy, she can use topical steroids on it, I am happy to send on or she can use OTC cortisone cream. Occasionally we see this with changes to the thyroid, but if we treat the thyroid problem it does not lead to resolution of the GA rash.       Let me know if she has any other questions       erika

## 2020-10-17 ENCOUNTER — E-VISIT (OUTPATIENT)
Dept: PEDIATRICS | Facility: CLINIC | Age: 75
End: 2020-10-17
Payer: COMMERCIAL

## 2020-10-17 ENCOUNTER — MYC MEDICAL ADVICE (OUTPATIENT)
Dept: PEDIATRICS | Facility: CLINIC | Age: 75
End: 2020-10-17

## 2020-10-17 DIAGNOSIS — N30.90 BLADDER INFECTION: Primary | ICD-10-CM

## 2020-10-17 PROCEDURE — 99421 OL DIG E/M SVC 5-10 MIN: CPT | Performed by: FAMILY MEDICINE

## 2020-10-18 RX ORDER — NITROFURANTOIN 25; 75 MG/1; MG/1
100 CAPSULE ORAL 2 TIMES DAILY
Qty: 14 CAPSULE | Refills: 0 | Status: SHIPPED | OUTPATIENT
Start: 2020-10-18 | End: 2021-05-26

## 2021-01-15 ENCOUNTER — HEALTH MAINTENANCE LETTER (OUTPATIENT)
Age: 76
End: 2021-01-15

## 2021-02-17 ENCOUNTER — MYC MEDICAL ADVICE (OUTPATIENT)
Dept: FAMILY MEDICINE | Facility: CLINIC | Age: 76
End: 2021-02-17

## 2021-03-02 ENCOUNTER — MYC MEDICAL ADVICE (OUTPATIENT)
Dept: FAMILY MEDICINE | Facility: CLINIC | Age: 76
End: 2021-03-02

## 2021-03-09 ENCOUNTER — IMMUNIZATION (OUTPATIENT)
Dept: PEDIATRICS | Facility: CLINIC | Age: 76
End: 2021-03-09
Payer: COMMERCIAL

## 2021-03-09 ENCOUNTER — MYC MEDICAL ADVICE (OUTPATIENT)
Dept: FAMILY MEDICINE | Facility: CLINIC | Age: 76
End: 2021-03-09

## 2021-03-09 PROCEDURE — 91300 PR COVID VAC PFIZER DIL RECON 30 MCG/0.3 ML IM: CPT

## 2021-03-09 PROCEDURE — 0001A PR COVID VAC PFIZER DIL RECON 30 MCG/0.3 ML IM: CPT

## 2021-03-30 ENCOUNTER — IMMUNIZATION (OUTPATIENT)
Dept: PEDIATRICS | Facility: CLINIC | Age: 76
End: 2021-03-30
Attending: INTERNAL MEDICINE
Payer: COMMERCIAL

## 2021-03-30 PROCEDURE — 91300 PR COVID VAC PFIZER DIL RECON 30 MCG/0.3 ML IM: CPT

## 2021-03-30 PROCEDURE — 0002A PR COVID VAC PFIZER DIL RECON 30 MCG/0.3 ML IM: CPT

## 2021-04-08 ENCOUNTER — MYC MEDICAL ADVICE (OUTPATIENT)
Dept: DERMATOLOGY | Facility: CLINIC | Age: 76
End: 2021-04-08

## 2021-05-24 NOTE — PATIENT INSTRUCTIONS
Preventive Health Recommendations    See your health care provider every year to    Review health changes.     Discuss preventive care.      Review your medicines if your doctor has prescribed any.      You no longer need a yearly Pap test unless you've had an abnormal Pap test in the past 10 years. If you have vaginal symptoms, such as bleeding or discharge, be sure to talk with your provider about a Pap test.      Every 1 to 2 years, have a mammogram.  If you are over 69, talk with your health care provider about whether or not you want to continue having screening mammograms.      Every 10 years, have a colonoscopy. Or, have a yearly FIT test (stool test). These exams will check for colon cancer.       Have a cholesterol test every 5 years, or more often if your doctor advises it.       Have a diabetes test (fasting glucose) every three years. If you are at risk for diabetes, you should have this test more often.       At age 65, have a bone density scan (DEXA) to check for osteoporosis (brittle bone disease).    Shots:    Get a flu shot each year.    Get a tetanus shot every 10 years.    Talk to your doctor about your pneumonia vaccines. There are now two you should receive - Pneumovax (PPSV 23) and Prevnar (PCV 13).    Talk to your pharmacist about the shingles vaccine.    Talk to your doctor about the hepatitis B vaccine.    Nutrition:     Eat at least 5 servings of fruits and vegetables each day.      Eat whole-grain bread, whole-wheat pasta and brown rice instead of white grains and rice.      Get adequate about Calcium and Vitamin D.     Lifestyle    Exercise at least 150 minutes a week (30 minutes a day, 5 days a week). This will help you control your weight and prevent disease.      Limit alcohol to one drink per day.      No smoking.       Wear sunscreen to prevent skin cancer.       See your dentist twice a year for an exam and cleaning.      See your eye doctor every 1 to 2 years to screen for  conditions such as glaucoma, macular degeneration, cataracts, etc.    Personalized Prevention Plan  You are due for the preventive services outlined below.  Your care team is available to assist you in scheduling these services.  If you have already completed any of these items, please share that information with your care team to update in your medical record.    Health Maintenance Due   Topic Date Due     ANNUAL REVIEW OF HM ORDERS  Never done     Hepatitis C Screening  Never done     Annual Wellness Visit  12/18/2013     FALL RISK ASSESSMENT  01/04/2018     Cholesterol Lab  02/14/2018     Discuss Advance Care Planning  05/29/2018     PHQ-2  01/01/2021     CERA VE  EUCERIN  AQUPHOR  CETAPHIL

## 2021-05-24 NOTE — PROGRESS NOTES
"SUBJECTIVE:   Zayra Diop is a 75 year old female who presents for Preventive Visit.    Patient has been advised of split billing requirements and indicates understanding: No     Are you in the first 12 months of your Medicare coverage?  No    Healthy Habits:     In general, how would you rate your overall health?  Good    Frequency of exercise:  2-3 days/week    Duration of exercise:  30-45 minutes    Do you usually eat at least 4 servings of fruit and vegetables a day, include whole grains    & fiber and avoid regularly eating high fat or \"junk\" foods?  Yes    Ability to successfully perform activities of daily living:  No assistance needed    Home Safety:  No safety concerns identified    Hearing Impairment:  Need to ask people to speak up or repeat themselves    In the past 6 months, have you been bothered by leaking of urine?  No    In general, how would you rate your overall mental or emotional health?  Good      PHQ-2 Total Score: 0    Additional concerns today:  Yes (pain on top of left foot, hot flashes)    Do you feel safe in your environment? Yes    Have you ever done Advance Care Planning? (For example, a Health Directive, POLST, or a discussion with a medical provider or your loved ones about your wishes): Yes, advance care planning is on file.      Right Ear:      1000 Hz RESPONSE- on Level: 40 db (Conditioning sound)   1000 Hz: RESPONSE- on Level:   20 db    2000 Hz: RESPONSE- on Level:   20 db    4000 Hz: RESPONSE- on Level:   20 db     Left Ear:      4000 Hz: RESPONSE- on Level:   20 db    2000 Hz: RESPONSE- on Level:   20 db    1000 Hz: RESPONSE- on Level:   20 db     500 Hz: RESPONSE- on Level: 25 db    Right Ear:    500 Hz: RESPONSE- on Level: 25 db    Hearing Acuity: Pass    Hearing Assessment: normal   Fall risk  Fallen 2 or more times in the past year?: No  Any fall with injury in the past year?: No    Cognitive Screening   1) Repeat 3 items (Leader, Season, Table)    2) Clock draw: " NORMAL  3) 3 item recall: Recalls 2 objects   Results: NORMAL clock, 1-2 items recalled: COGNITIVE IMPAIRMENT LESS LIKELY    Mini-CogTM Copyright BANDAR [employer]. Licensed by the author for use in [facility]; reprinted with permission (teodora@[email or web address]). All rights reserved.      Do you have sleep apnea, excessive snoring or daytime drowsiness?: yes    Reviewed and updated as needed this visit by clinical staff  Tobacco  Allergies  Meds   Med Hx  Surg Hx  Fam Hx  Soc Hx      Musculoskeletal problem/pain      Duration: FOOT PAIN    Description  Location: 9 MONTHS AGO WITHOUT TRAUMA USES FOOT INSERTS    Intensity:  moderate    Accompanying signs and symptoms: none    History  Previous similar problem: no   Previous evaluation:  none    Precipitating or alleviating factors:  Trauma or overuse: no   Aggravating factors include: walking    Therapies tried and outcome: nothing      Reviewed and updated as needed this visit by Provider                Social History     Tobacco Use     Smoking status: Never Smoker     Smokeless tobacco: Never Used   Substance Use Topics     Alcohol use: Yes     Comment: social, 2 drinks/week         Alcohol Use 5/26/2021   Prescreen: >3 drinks/day or >7 drinks/week? No   Prescreen: >3 drinks/day or >7 drinks/week? -   No flowsheet data found.      Current providers sharing in care for this patient include:   Patient Care Team:  Radha Puentes MD as PCP - General (Family Medicine)  Radha Puentes MD as Assigned PCP  Erika Navas PA-C as Assigned Surgical Provider    The following health maintenance items are reviewed in Epic and correct as of today:  Health Maintenance Due   Topic Date Due     ANNUAL REVIEW OF HM ORDERS  Never done     HEPATITIS C SCREENING  Never done     FALL RISK ASSESSMENT  01/04/2018     LIPID  02/14/2018     ADVANCE CARE PLANNING  05/29/2018     BP Readings from Last 3 Encounters:   05/26/21 (!) 144/94   06/24/20 (!) 138/92   01/14/20 126/77     Wt Readings from Last 3 Encounters:   05/26/21 87.3 kg (192 lb 6.4 oz)   06/24/20 86.8 kg (191 lb 4.8 oz)   01/09/17 78.9 kg (174 lb)                  Patient Active Problem List   Diagnosis     Health Care Home     Breast cancer (H)     Family history of thyroid disease     Hyperlipidemia LDL goal <130     Vitamin D deficiency disease     Advance care planning     Chronic cough     Past Surgical History:   Procedure Laterality Date     APPENDECTOMY  1969     CATARACT IOL, RT/LT      laser treatment following     LAPAROSCOPY  1972    for infertility     LUMPECTOMY BREAST  1999    right     MASTECTOMY  6/2010    left       Social History     Tobacco Use     Smoking status: Never Smoker     Smokeless tobacco: Never Used   Substance Use Topics     Alcohol use: Yes     Comment: social, 2 drinks/week     Family History   Problem Relation Age of Onset     Breast Cancer Sister      Breast Cancer Sister      Heart Disease Sister 74        stents     Alzheimer Disease Father      Kidney Cancer Brother         Kidney     Cancer Mother         Hodgkins     Cardiovascular Brother         has Heart problems had a TIA          Current Outpatient Medications   Medication Sig Dispense Refill     multivitamin (ONE-DAILY) tablet Take 1 tablet by mouth daily 90 tablet 3     venlafaxine (EFFEXOR-XR) 37.5 MG 24 hr capsule Take 1 capsule (37.5 mg) by mouth daily 90 capsule 0     VITAMIN D PO        triamcinolone (KENALOG) 0.1 % external ointment Apply topically 2 times daily (Patient not taking: Reported on 5/26/2021) 30 g 1     Allergies   Allergen Reactions     Propofol Itching     Recent Labs   Lab Test 05/26/21  0859 09/24/20  1335 10/11/18  1339   *  --   --    HDL 60  --   --    TRIG 260*  --   --    ALT 27  --  35   CR 0.89  --  0.84   GFRESTIMATED 64  --  66   GFRESTBLACK 74  --  80   POTASSIUM 4.5  --  3.8   TSH  --  2.09  --               Review of Systems   Constitutional: Negative for chills and fever.   HENT: Negative  "for congestion, ear pain, hearing loss and sore throat.    Eyes: Negative for pain and visual disturbance.   Respiratory: Positive for cough. Negative for shortness of breath.    Cardiovascular: Negative for chest pain, palpitations and peripheral edema.   Gastrointestinal: Negative for abdominal pain, constipation, diarrhea, heartburn, hematochezia and nausea.   Breasts:  Negative for tenderness, breast mass and discharge.   Genitourinary: Negative for dysuria, frequency, genital sores, hematuria, pelvic pain, urgency, vaginal bleeding and vaginal discharge.   Musculoskeletal: Positive for arthralgias. Negative for myalgias.   Skin: Negative for rash.   Neurological: Positive for paresthesias. Negative for dizziness, weakness and headaches.   Psychiatric/Behavioral: Negative for mood changes.         OBJECTIVE:   BP (!) 164/92   Pulse (!) 45   Temp 98.6  F (37  C) (Temporal)   Resp 12   Ht 1.716 m (5' 7.56\")   Wt 87.3 kg (192 lb 6.4 oz)   LMP 11/10/1986 (Exact Date)   SpO2 94%   BMI 29.64 kg/m   Estimated body mass index is 29.64 kg/m  as calculated from the following:    Height as of this encounter: 1.716 m (5' 7.56\").    Weight as of this encounter: 87.3 kg (192 lb 6.4 oz).  Physical Exam  Musculoskeletal:      Left foot: Normal range of motion and normal capillary refill. Tenderness, bony tenderness and swelling present. No crepitus, deformity or laceration.        Feet:        GENERAL: healthy, alert and no distress  EYES: Eyes grossly normal to inspection, PERRL and conjunctivae and sclerae normal  HENT: ear canals and TM's normal, nose and mouth without ulcers or lesions  NECK: no adenopathy, no asymmetry, masses, or scars and thyroid normal to palpation  RESP: lungs clear to auscultation - no rales, rhonchi or wheezes  BREAST: normal without masses, tenderness or nipple discharge and no palpable axillary masses or adenopathy  CV: regular rate and rhythm, normal S1 S2, no S3 or S4, no murmur, click or " rub, no peripheral edema and peripheral pulses strong  ABDOMEN: soft, nontender, no hepatosplenomegaly, no masses and bowel sounds normal  MS: no gross musculoskeletal defects noted, no edema  SKIN: no suspicious lesions or rashes  NEURO: Normal strength and tone, mentation intact and speech normal  PSYCH: mentation appears normal, affect normal/bright  Foot exam: normal DP and PT pulses, no trophic changes or ulcerative lesions and normal sensory exam        ASSESSMENT / PLAN:   1. Routine general medical examination at a health care facility  See counseling  - REVIEW OF HEALTH MAINTENANCE PROTOCOL ORDERS    2. Need for hepatitis C screening test  - Hepatitis C Screen Reflex to HCV RNA Quant and Genotype    3.  Hyperlipidemia LDL goal <130  - Lipid panel reflex to direct LDL Fasting    5. Screening for endocrine, metabolic and immunity disorder  - Comprehensive metabolic panel    6. Asymptomatic menopausal state   - DX Hip/Pelvis/Spine; Future    7. 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 Encounter for screening mammogram for malignant neoplasm of breast   - MA Screen with Implants Bilateral w/Gerson; Future    9. Vitamin D deficiency disease  - Vitamin D Deficiency  - multivitamin (ONE-DAILY) tablet; Take 1 tablet by mouth daily  Dispense: 90 tablet; Refill: 3    10. Menopausal syndrome (hot flashes)  - venlafaxine (EFFEXOR-XR) 37.5 MG 24 hr capsule; Take 1 capsule (37.5 mg) by mouth daily  Dispense: 90 capsule; Refill: 0    11. Left foot pain  DJD with a mild case of plantar fascitis  Rest the affected painful area as much as possible.  Apply ice for 15-20 minutes intermittently as needed and especially after any offending activity. Daily stretching.  As pain recedes, begin normal activities slowly as tolerated.  Consider Physical Therapy if symptoms not better with symptomatic care.    - XR  "Foot Left G/E 3 Views; Future    Patient has been advised of split billing requirements and indicates understanding: Yes  COUNSELING:  Reviewed preventive health counseling, as reflected in patient instructions       Regular exercise       Healthy diet/nutrition       Vision screening       Hearing screening       Dental care       Bladder control       Fall risk prevention       Osteoporosis prevention/bone health       Hepatitis C screening       HIV screening for high risk patient       Advanced Planning     Estimated body mass index is 29.64 kg/m  as calculated from the following:    Height as of this encounter: 1.716 m (5' 7.56\").    Weight as of this encounter: 87.3 kg (192 lb 6.4 oz).        She reports that she has never smoked. She has never used smokeless tobacco.      Appropriate preventive services were discussed with this patient, including applicable screening as appropriate for cardiovascular disease, diabetes, osteopenia/osteoporosis, and glaucoma.  As appropriate for age/gender, discussed screening for colorectal cancer, prostate cancer, breast cancer, and cervical cancer. Checklist reviewing preventive services available has been given to the patient.    Reviewed patients plan of care and provided an AVS. The Intermediate Care Plan ( asthma action plan, low back pain action plan, and migraine action plan) for Zayra meets the Care Plan requirement. This Care Plan has been established and reviewed with the Patient.    Counseling Resources:  ATP IV Guidelines  Pooled Cohorts Equation Calculator  Breast Cancer Risk Calculator  Breast Cancer: Medication to Reduce Risk  FRAX Risk Assessment  ICSI Preventive Guidelines  Dietary Guidelines for Americans, 2010  USDA's MyPlate  ASA Prophylaxis  Lung CA Screening    Radha Puentes MD  [facility]    Identified Health Risks:  "

## 2021-05-26 ENCOUNTER — OFFICE VISIT (OUTPATIENT)
Dept: FAMILY MEDICINE | Facility: CLINIC | Age: 76
End: 2021-05-26
Payer: COMMERCIAL

## 2021-05-26 ENCOUNTER — ANCILLARY PROCEDURE (OUTPATIENT)
Dept: GENERAL RADIOLOGY | Facility: CLINIC | Age: 76
End: 2021-05-26
Attending: FAMILY MEDICINE
Payer: COMMERCIAL

## 2021-05-26 VITALS
DIASTOLIC BLOOD PRESSURE: 94 MMHG | OXYGEN SATURATION: 94 % | HEIGHT: 68 IN | WEIGHT: 192.4 LBS | RESPIRATION RATE: 12 BRPM | SYSTOLIC BLOOD PRESSURE: 144 MMHG | HEART RATE: 45 BPM | BODY MASS INDEX: 29.16 KG/M2 | TEMPERATURE: 98.6 F

## 2021-05-26 DIAGNOSIS — Z13.0 SCREENING FOR ENDOCRINE, METABOLIC AND IMMUNITY DISORDER: ICD-10-CM

## 2021-05-26 DIAGNOSIS — M79.672 LEFT FOOT PAIN: ICD-10-CM

## 2021-05-26 DIAGNOSIS — Z17.0 MALIGNANT NEOPLASM OF LEFT BREAST IN FEMALE, ESTROGEN RECEPTOR POSITIVE, UNSPECIFIED SITE OF BREAST (H): ICD-10-CM

## 2021-05-26 DIAGNOSIS — Z78.0 ASYMPTOMATIC MENOPAUSAL STATE: ICD-10-CM

## 2021-05-26 DIAGNOSIS — C50.912 MALIGNANT NEOPLASM OF LEFT BREAST IN FEMALE, ESTROGEN RECEPTOR POSITIVE, UNSPECIFIED SITE OF BREAST (H): ICD-10-CM

## 2021-05-26 DIAGNOSIS — Z00.00 ROUTINE GENERAL MEDICAL EXAMINATION AT A HEALTH CARE FACILITY: Primary | ICD-10-CM

## 2021-05-26 DIAGNOSIS — Z11.59 NEED FOR HEPATITIS C SCREENING TEST: ICD-10-CM

## 2021-05-26 DIAGNOSIS — E55.9 VITAMIN D DEFICIENCY DISEASE: ICD-10-CM

## 2021-05-26 DIAGNOSIS — N95.1 MENOPAUSAL SYNDROME (HOT FLASHES): ICD-10-CM

## 2021-05-26 DIAGNOSIS — Z13.220 SCREENING FOR HYPERLIPIDEMIA: ICD-10-CM

## 2021-05-26 DIAGNOSIS — E78.5 HYPERLIPIDEMIA LDL GOAL <130: ICD-10-CM

## 2021-05-26 DIAGNOSIS — Z12.31 ENCOUNTER FOR SCREENING MAMMOGRAM FOR MALIGNANT NEOPLASM OF BREAST: ICD-10-CM

## 2021-05-26 DIAGNOSIS — Z13.29 SCREENING FOR ENDOCRINE, METABOLIC AND IMMUNITY DISORDER: ICD-10-CM

## 2021-05-26 DIAGNOSIS — Z13.228 SCREENING FOR ENDOCRINE, METABOLIC AND IMMUNITY DISORDER: ICD-10-CM

## 2021-05-26 LAB
ALBUMIN SERPL-MCNC: 4.1 G/DL (ref 3.4–5)
ALP SERPL-CCNC: 68 U/L (ref 40–150)
ALT SERPL W P-5'-P-CCNC: 27 U/L (ref 0–50)
ANION GAP SERPL CALCULATED.3IONS-SCNC: 4 MMOL/L (ref 3–14)
AST SERPL W P-5'-P-CCNC: 19 U/L (ref 0–45)
BILIRUB SERPL-MCNC: 0.6 MG/DL (ref 0.2–1.3)
BUN SERPL-MCNC: 14 MG/DL (ref 7–30)
CALCIUM SERPL-MCNC: 9.5 MG/DL (ref 8.5–10.1)
CHLORIDE SERPL-SCNC: 105 MMOL/L (ref 94–109)
CHOLEST SERPL-MCNC: 289 MG/DL
CO2 SERPL-SCNC: 30 MMOL/L (ref 20–32)
CREAT SERPL-MCNC: 0.89 MG/DL (ref 0.52–1.04)
DEPRECATED CALCIDIOL+CALCIFEROL SERPL-MC: 24 UG/L (ref 20–75)
GFR SERPL CREATININE-BSD FRML MDRD: 64 ML/MIN/{1.73_M2}
GLUCOSE SERPL-MCNC: 99 MG/DL (ref 70–99)
HCV AB SERPL QL IA: NONREACTIVE
HDLC SERPL-MCNC: 60 MG/DL
LDLC SERPL CALC-MCNC: 177 MG/DL
NONHDLC SERPL-MCNC: 229 MG/DL
POTASSIUM SERPL-SCNC: 4.5 MMOL/L (ref 3.4–5.3)
PROT SERPL-MCNC: 7.9 G/DL (ref 6.8–8.8)
SODIUM SERPL-SCNC: 139 MMOL/L (ref 133–144)
TRIGL SERPL-MCNC: 260 MG/DL

## 2021-05-26 PROCEDURE — 36415 COLL VENOUS BLD VENIPUNCTURE: CPT | Performed by: FAMILY MEDICINE

## 2021-05-26 PROCEDURE — 99397 PER PM REEVAL EST PAT 65+ YR: CPT | Performed by: FAMILY MEDICINE

## 2021-05-26 PROCEDURE — 80061 LIPID PANEL: CPT | Performed by: FAMILY MEDICINE

## 2021-05-26 PROCEDURE — 99173 VISUAL ACUITY SCREEN: CPT | Mod: 25 | Performed by: FAMILY MEDICINE

## 2021-05-26 PROCEDURE — 99213 OFFICE O/P EST LOW 20 MIN: CPT | Mod: 25 | Performed by: FAMILY MEDICINE

## 2021-05-26 PROCEDURE — 86803 HEPATITIS C AB TEST: CPT | Performed by: FAMILY MEDICINE

## 2021-05-26 PROCEDURE — 73630 X-RAY EXAM OF FOOT: CPT | Mod: LT | Performed by: RADIOLOGY

## 2021-05-26 PROCEDURE — 80053 COMPREHEN METABOLIC PANEL: CPT | Performed by: FAMILY MEDICINE

## 2021-05-26 PROCEDURE — 82306 VITAMIN D 25 HYDROXY: CPT | Performed by: FAMILY MEDICINE

## 2021-05-26 RX ORDER — VENLAFAXINE HYDROCHLORIDE 37.5 MG/1
37.5 CAPSULE, EXTENDED RELEASE ORAL DAILY
Qty: 90 CAPSULE | Refills: 0 | Status: SHIPPED | OUTPATIENT
Start: 2021-05-26 | End: 2021-08-24

## 2021-05-26 RX ORDER — MULTIVITAMIN
1 TABLET ORAL DAILY
Qty: 90 TABLET | Refills: 3 | Status: SHIPPED | OUTPATIENT
Start: 2021-05-26 | End: 2024-06-18

## 2021-05-26 ASSESSMENT — ENCOUNTER SYMPTOMS
MYALGIAS: 0
DIARRHEA: 0
CHILLS: 0
WEAKNESS: 0
ABDOMINAL PAIN: 0
DIZZINESS: 0
PARESTHESIAS: 1
FREQUENCY: 0
COUGH: 1
FEVER: 0
HEARTBURN: 0
BREAST MASS: 0
DYSURIA: 0
SHORTNESS OF BREATH: 0
NAUSEA: 0
SORE THROAT: 0
ARTHRALGIAS: 1
HEMATURIA: 0
CONSTIPATION: 0
HEMATOCHEZIA: 0
EYE PAIN: 0
PALPITATIONS: 0
HEADACHES: 0

## 2021-05-26 ASSESSMENT — MIFFLIN-ST. JEOR: SCORE: 1419.22

## 2021-05-26 ASSESSMENT — ACTIVITIES OF DAILY LIVING (ADL): CURRENT_FUNCTION: NO ASSISTANCE NEEDED

## 2021-05-27 NOTE — PROGRESS NOTES
Assessment & Plan     Pigmented skin lesion  - Surgical pathology exam  - EXC BENIGN SKIN LESION TRUNK/ARM/LEG <=0.5 CM  After informed consent was obtained, using Betadine for cleansing and 2% Lidocaine with epinephrine for anesthetic, with sterile technique elliptical excision in total was performed.  The wound is sutured with Ethilon with 3-0 Ethilon 6 sutures.  Antibiotic dressing is applied, and wound   care instructions provided.  Be alert for any signs of cutaneous infection.  The specimen is labeled and sent to pathology for evaluation.  The procedure was well tolerated without complications.Seborrheic dermatitis  - CRYOTHERAPY    The areas were treated with liquid nitrogen therapy, frozen until ice ball extended above lesion, allowed to thaw, and treated again. The patient tolerated procedure well. The patient was instructed on post-op care, warned that there may be blister formation, redness and pain. Recommend OTC analgesia as needed for pain.      50 minutes spent on the date of the encounter doing chart review, history and exam, documentation and further activities per the note      No follow-ups on file.    Radha Puentes MD  [facility] ALEX Iverson is a 75 year old who presents for the following health issues  accompanied by her Self:    HPI     SUBJECTIVE:  75 year old female presents for lesion removal and cryotherapy to multiple lesions at the skin of her back.            Objective    BP (!) 172/81   Pulse 51   Temp 98.6  F (37  C) (Temporal)   Resp 16   Wt 87.5 kg (193 lb)   LMP 11/10/1986 (Exact Date)   SpO2 97%   BMI 29.73 kg/m    Body mass index is 29.73 kg/m .  Physical Exam   GENERAL: healthy, alert and no distress  SKIN: Patient has multiple ranging from 0.1cm -0.2cm pigmented Seborrheic keratosis on her back, skin underneath bilateral breast, trunk. Patient has  2 pigmented lesion skin beneath her left armpit. It gets caught on her cloths, bra  and gets quite irritated. Lesion measures 0.2cm by 0.2 cm and inferior one is more wart like.    .

## 2021-05-28 ENCOUNTER — OFFICE VISIT (OUTPATIENT)
Dept: FAMILY MEDICINE | Facility: CLINIC | Age: 76
End: 2021-05-28
Payer: COMMERCIAL

## 2021-05-28 VITALS
TEMPERATURE: 98.6 F | BODY MASS INDEX: 29.73 KG/M2 | HEART RATE: 55 BPM | DIASTOLIC BLOOD PRESSURE: 93 MMHG | OXYGEN SATURATION: 97 % | RESPIRATION RATE: 16 BRPM | WEIGHT: 193 LBS | SYSTOLIC BLOOD PRESSURE: 165 MMHG

## 2021-05-28 DIAGNOSIS — L81.9 PIGMENTED SKIN LESION: Primary | ICD-10-CM

## 2021-05-28 DIAGNOSIS — L21.9 SEBORRHEIC DERMATITIS: ICD-10-CM

## 2021-05-28 PROCEDURE — 88305 TISSUE EXAM BY PATHOLOGIST: CPT | Performed by: FAMILY MEDICINE

## 2021-05-28 PROCEDURE — 99207 PR DROP WITH A PROCEDURE: CPT | Performed by: FAMILY MEDICINE

## 2021-05-28 PROCEDURE — 11400 EXC TR-EXT B9+MARG 0.5 CM<: CPT | Mod: 59 | Performed by: FAMILY MEDICINE

## 2021-05-28 PROCEDURE — 17110 DESTRUCTION B9 LES UP TO 14: CPT | Performed by: FAMILY MEDICINE

## 2021-05-28 PROCEDURE — 88305 TISSUE EXAM BY PATHOLOGIST: CPT | Performed by: PATHOLOGY

## 2021-05-28 ASSESSMENT — PAIN SCALES - GENERAL: PAINLEVEL: NO PAIN (0)

## 2021-06-02 LAB — COPATH REPORT: NORMAL

## 2021-06-28 ENCOUNTER — MYC MEDICAL ADVICE (OUTPATIENT)
Dept: FAMILY MEDICINE | Facility: CLINIC | Age: 76
End: 2021-06-28

## 2021-06-29 NOTE — TELEPHONE ENCOUNTER
Effie sent ----should we cancel 8/2/2021 appointment that is scheduled for patient at [facility] in [place]  Orders have been faxed to [phone]  Thanks  Carmne Pugh RT (R)

## 2021-07-20 DIAGNOSIS — N95.9 UNSPECIFIED MENOPAUSAL AND PERIMENOPAUSAL DISORDER: Primary | ICD-10-CM

## 2021-08-02 ENCOUNTER — ANCILLARY PROCEDURE (OUTPATIENT)
Dept: MAMMOGRAPHY | Facility: CLINIC | Age: 76
End: 2021-08-02
Attending: FAMILY MEDICINE
Payer: COMMERCIAL

## 2021-08-02 DIAGNOSIS — Z12.31 ENCOUNTER FOR SCREENING MAMMOGRAM FOR MALIGNANT NEOPLASM OF BREAST: ICD-10-CM

## 2021-08-02 DIAGNOSIS — C50.912 MALIGNANT NEOPLASM OF LEFT BREAST IN FEMALE, ESTROGEN RECEPTOR POSITIVE, UNSPECIFIED SITE OF BREAST (H): ICD-10-CM

## 2021-08-02 DIAGNOSIS — Z17.0 MALIGNANT NEOPLASM OF LEFT BREAST IN FEMALE, ESTROGEN RECEPTOR POSITIVE, UNSPECIFIED SITE OF BREAST (H): ICD-10-CM

## 2021-08-02 PROCEDURE — 77063 BREAST TOMOSYNTHESIS BI: CPT | Performed by: RADIOLOGY

## 2021-08-02 PROCEDURE — 77067 SCR MAMMO BI INCL CAD: CPT | Performed by: RADIOLOGY

## 2021-08-04 ENCOUNTER — ANCILLARY PROCEDURE (OUTPATIENT)
Dept: BONE DENSITY | Facility: CLINIC | Age: 76
End: 2021-08-04
Attending: FAMILY MEDICINE
Payer: COMMERCIAL

## 2021-08-04 PROCEDURE — 77080 DXA BONE DENSITY AXIAL: CPT | Performed by: RADIOLOGY

## 2021-08-21 DIAGNOSIS — N95.1 MENOPAUSAL SYNDROME (HOT FLASHES): ICD-10-CM

## 2021-08-24 RX ORDER — VENLAFAXINE HYDROCHLORIDE 37.5 MG/1
CAPSULE, EXTENDED RELEASE ORAL
Qty: 90 CAPSULE | Refills: 0 | Status: SHIPPED | OUTPATIENT
Start: 2021-08-24 | End: 2022-04-30

## 2021-08-24 NOTE — TELEPHONE ENCOUNTER
Pending Prescriptions:                       Disp   Refills    venlafaxine (EFFEXOR-XR) 37.5 MG 24 hr cap*90 cap*0        Sig: TAKE 1 CAPSULE BY MOUTH EVERY DAY      Routing refill request to provider for review/approval because:  Labs out of range:  blood pressure    Carmen Lambert RN on 8/24/2021 at 12:12 PM

## 2021-10-24 ENCOUNTER — HEALTH MAINTENANCE LETTER (OUTPATIENT)
Age: 76
End: 2021-10-24

## 2022-04-19 NOTE — PROGRESS NOTES
"SUBJECTIVE:   Zayra Diop is a 76 year old female who presents for Preventive Visit.        Are you in the first 12 months of your Medicare coverage?  No    Healthy Habits:     In general, how would you rate your overall health?  Good    Frequency of exercise:  2-3 days/week    Duration of exercise:  30-45 minutes    Do you usually eat at least 4 servings of fruit and vegetables a day, include whole grains    & fiber and avoid regularly eating high fat or \"junk\" foods?  No    Taking medications regularly:  Yes    Ability to successfully perform activities of daily living:  No assistance needed    Home Safety:  No safety concerns identified    Hearing Impairment:  Need to ask people to speak up or repeat themselves    In the past 6 months, have you been bothered by leaking of urine?  No    In general, how would you rate your overall mental or emotional health?  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 Yes    Have you ever done Advance Care Planning? (For example, a Health Directive, POLST, or a discussion with a medical provider or your loved ones about your wishes): Yes, advance care planning is on file.       Fall risk  Fallen 2 or more times in the past year?: No  Any fall with injury in the past year?: No    Cognitive Screening   1) Repeat 3 items (Leader, Season, Table)    2) Clock draw: NORMAL  3) 3 item recall: Recalls 3 objects  Results: 3 items recalled: COGNITIVE IMPAIRMENT LESS LIKELY    Mini-CogTM Copyright [employer]. Licensed by the author for use in [employer]; reprinted with permission (teodora@.edu). All rights reserved.      Do you have " sleep apnea, excessive snoring or daytime drowsiness?: yes    Reviewed and updated as needed this visit by clinical staff   Tobacco  Allergies  Meds  Problems  Med Hx  Surg Hx  Fam Hx  Soc   Hx          Reviewed and updated as needed this visit by Provider   Tobacco    Problems  Med Hx  Surg Hx  Fam Hx  Soc Hx         Social History     Tobacco Use     Smoking status: Never Smoker     Smokeless tobacco: Never Used   Substance Use Topics     Alcohol use: Yes     Comment: social, 2 drinks/week         No flowsheet data found.          Current providers sharing in care for this patient include:   Patient Care Team:  Radha Puentes MD as PCP - General (Family Medicine)  Radha Puentes MD as Assigned PCP    The following health maintenance items are reviewed in Epic and correct as of today:  Health Maintenance Due   Topic Date Due     Pneumococcal Vaccine: 65+ Years (2 - PCV) 12/19/2013     ANNUAL REVIEW OF HM ORDERS  05/26/2022         Review of Systems   Constitutional: Positive for chills. Negative for fever.   HENT: Negative for congestion, ear pain, hearing loss and sore throat.    Eyes: Negative for pain and visual disturbance.   Respiratory: Positive for cough. Negative for shortness of breath.    Cardiovascular: Negative for chest pain, palpitations and peripheral edema.   Gastrointestinal: Negative for abdominal pain, constipation, diarrhea, heartburn, hematochezia and nausea.   Breasts:  Negative for tenderness, breast mass and discharge.   Genitourinary: Negative for dysuria, frequency, genital sores, hematuria, pelvic pain, urgency, vaginal bleeding and vaginal discharge.   Musculoskeletal: Positive for arthralgias. Negative for joint swelling and myalgias.   Skin: Positive for rash.   Neurological: Positive for paresthesias. Negative for dizziness, weakness and headaches.   Psychiatric/Behavioral: Negative for mood changes. The patient is not nervous/anxious.          OBJECTIVE:   BP  "(!) 146/92   Pulse 53   Temp 97.3  F (36.3  C) (Temporal)   Resp 16   Ht 1.72 m (5' 7.72\")   Wt 90.2 kg (198 lb 14.4 oz)   LMP 11/10/1986 (Exact Date)   SpO2 95%   BMI 30.50 kg/m   Estimated body mass index is 30.5 kg/m  as calculated from the following:    Height as of this encounter: 1.72 m (5' 7.72\").    Weight as of this encounter: 90.2 kg (198 lb 14.4 oz).  Physical Exam  GENERAL: healthy, alert and no distress  EYES: Eyes grossly normal to inspection, PERRL and conjunctivae and sclerae normal  HENT: ear canals and TM's normal, nose and mouth without ulcers or lesions  NECK: no adenopathy, no asymmetry, masses, or scars and thyroid normal to palpation  RESP: lungs clear to auscultation - no rales, rhonchi or wheezes  BREAST: normal without masses, tenderness or nipple discharge and no palpable axillary masses or adenopathy  CV: regular rate and rhythm, normal S1 S2, no S3 or S4, no murmur, click or rub, no peripheral edema and peripheral pulses strong  ABDOMEN: soft, nontender, no hepatosplenomegaly, no masses and bowel sounds normal  MS: no gross musculoskeletal defects noted, no edema  SKIN: 0.1 cm wart like papule skin of left chest  NEURO: Normal strength and tone, mentation intact and speech normal  PSYCH: mentation appears normal, affect normal/bright        ASSESSMENT / PLAN:   (Z00.00) Encounter for Medicare annual wellness exam  (primary encounter diagnosis)  Comment: See Counseling      (C50.912,  Z17.0) 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, estrogen receptor positive, unspecified site of breast (H)  Comment: See order   Plan: MA Screening Digital Bilateral, US Axillary         (Z12.31) Encounter for screening mammogram for malignant neoplasm of breast   Comment: See order   Plan: *MA Screening Digital Bilateral      (R05.3) Chronic cough  Comment: See order   Plan: pantoprazole (PROTONIX) 20 MG EC tablet    (K21.00) Gastroesophageal reflux disease with esophagitis, unspecified whether " "hemorrhage  Comment: As above  Plan: pantoprazole (PROTONIX) 20 MG EC tablet      (Z13.29,  Z13.228,  Z13.0) Screening for endocrine, metabolic and immunity disorder  Comment: See order   Plan: TSH with free T4 reflex, Glucose      (Z13.220) Lipid screening  Comment: See order   Plan: Lipid panel reflex to direct LDL Fasting      (L81.9) Pigmented skin lesion  Comment: PLAN:  After informed consent was obtained, using Betadine for cleansing   and 1% Lidocaine with epinephrine for anesthetic, with sterile   technique a shave excision of the lesion was performed  flush with   the surrounding skin.  Hemostasis was obtained by  light cautery.    Antibiotic dressing is applied, and wound care instructions   provided.  Be alert for any signs of cutaneous infection.  The   specimen is labelled and sent to pathology for evaluation.  The   procedure was well tolerated without complications.    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  Comment: See order   Plan: augmented betamethasone dipropionate         (DIPROLENE) 0.05 % external gel      (L72.3) Sebaceous cyst of left axilla  Comment: See order   Plan: US Axillary Left      (D36.7) Benign neoplasm of other specified sites   Comment: See order   Plan: US Axillary Left        Patient has been advised of split billing requirements and indicates understanding: Yes    COUNSELING:  Reviewed preventive health counseling, as reflected in patient instructions       Regular exercise       Healthy diet/nutrition       Vision screening       Hearing screening       Dental care       Bladder control       Fall risk prevention       Osteoporosis prevention/bone health       Colon cancer screening       Hepatitis C screening       Advanced Planning     Estimated body mass index is 30.5 kg/m  as calculated from the following:    Height as of this encounter: 1.72 m (5' 7.72\").    " Weight as of this encounter: 90.2 kg (198 lb 14.4 oz).    Weight management plan: Discussed healthy diet and exercise guidelines    She reports that she has never smoked. She has never used smokeless tobacco.      Appropriate preventive services were discussed with this patient, including applicable screening as appropriate for cardiovascular disease, diabetes, osteopenia/osteoporosis, and glaucoma.  As appropriate for age/gender, discussed screening for colorectal cancer, prostate cancer, breast cancer, and cervical cancer. Checklist reviewing preventive services available has been given to the patient.    Reviewed patients plan of care and provided an AVS. The Complex Care Plan (for patients with higher acuity and needing more deliberate coordination of services) for Zayra meets the Care Plan requirement. This Care Plan has been established and reviewed with the Patient.    Counseling Resources:  ATP IV Guidelines  Pooled Cohorts Equation Calculator  Breast Cancer Risk Calculator  Breast Cancer: Medication to Reduce Risk  FRAX Risk Assessment  ICSI Preventive Guidelines  Dietary Guidelines for Americans, 2010  USDA's MyPlate  ASA Prophylaxis  Lung CA Screening    Radha Puentes MD  [facility]    Identified Health Risks:

## 2022-04-19 NOTE — PATIENT INSTRUCTIONS
Patient Education   Personalized Prevention Plan  You are due for the preventive services outlined below.  Your care team is available to assist you in scheduling these services.  If you have already completed any of these items, please share that information with your care team to update in your medical record.  Health Maintenance Due   Topic Date Due     PHQ-2 (once per calendar year)  01/01/2022

## 2022-04-24 ASSESSMENT — ENCOUNTER SYMPTOMS
SORE THROAT: 0
DYSURIA: 0
BREAST MASS: 0
MYALGIAS: 0
DIARRHEA: 0
EYE PAIN: 0
PARESTHESIAS: 1
HEARTBURN: 0
NERVOUS/ANXIOUS: 0
DIZZINESS: 0
HEADACHES: 0
CHILLS: 1
SHORTNESS OF BREATH: 0
ABDOMINAL PAIN: 0
JOINT SWELLING: 0
FEVER: 0
CONSTIPATION: 0
ARTHRALGIAS: 1
COUGH: 1
WEAKNESS: 0
NAUSEA: 0
FREQUENCY: 0
HEMATURIA: 0
PALPITATIONS: 0
HEMATOCHEZIA: 0

## 2022-04-24 ASSESSMENT — ACTIVITIES OF DAILY LIVING (ADL): CURRENT_FUNCTION: NO ASSISTANCE NEEDED

## 2022-04-27 ENCOUNTER — OFFICE VISIT (OUTPATIENT)
Dept: FAMILY MEDICINE | Facility: CLINIC | Age: 77
End: 2022-04-27
Payer: COMMERCIAL

## 2022-04-27 VITALS
RESPIRATION RATE: 16 BRPM | DIASTOLIC BLOOD PRESSURE: 92 MMHG | SYSTOLIC BLOOD PRESSURE: 146 MMHG | WEIGHT: 198.9 LBS | HEIGHT: 68 IN | HEART RATE: 53 BPM | TEMPERATURE: 97.3 F | BODY MASS INDEX: 30.14 KG/M2 | OXYGEN SATURATION: 95 %

## 2022-04-27 DIAGNOSIS — Z13.0 SCREENING FOR ENDOCRINE, METABOLIC AND IMMUNITY DISORDER: ICD-10-CM

## 2022-04-27 DIAGNOSIS — R05.3 CHRONIC COUGH: ICD-10-CM

## 2022-04-27 DIAGNOSIS — C50.912 MALIGNANT NEOPLASM OF LEFT BREAST IN FEMALE, ESTROGEN RECEPTOR POSITIVE, UNSPECIFIED SITE OF BREAST (H): ICD-10-CM

## 2022-04-27 DIAGNOSIS — D36.7 BENIGN NEOPLASM OF OTHER SPECIFIED SITES: ICD-10-CM

## 2022-04-27 DIAGNOSIS — Z12.31 ENCOUNTER FOR SCREENING MAMMOGRAM FOR MALIGNANT NEOPLASM OF BREAST: ICD-10-CM

## 2022-04-27 DIAGNOSIS — Z13.220 LIPID SCREENING: ICD-10-CM

## 2022-04-27 DIAGNOSIS — L92.0 GRANULOMA ANNULARE: ICD-10-CM

## 2022-04-27 DIAGNOSIS — E55.9 VITAMIN D DEFICIENCY: ICD-10-CM

## 2022-04-27 DIAGNOSIS — L72.3 SEBACEOUS CYST OF LEFT AXILLA: ICD-10-CM

## 2022-04-27 DIAGNOSIS — Z17.0 MALIGNANT NEOPLASM OF LEFT BREAST IN FEMALE, ESTROGEN RECEPTOR POSITIVE, UNSPECIFIED SITE OF BREAST (H): ICD-10-CM

## 2022-04-27 DIAGNOSIS — L81.9 PIGMENTED SKIN LESION: ICD-10-CM

## 2022-04-27 DIAGNOSIS — Z00.00 ENCOUNTER FOR MEDICARE ANNUAL WELLNESS EXAM: Primary | ICD-10-CM

## 2022-04-27 DIAGNOSIS — K21.00 GASTROESOPHAGEAL REFLUX DISEASE WITH ESOPHAGITIS, UNSPECIFIED WHETHER HEMORRHAGE: ICD-10-CM

## 2022-04-27 DIAGNOSIS — Z13.29 SCREENING FOR ENDOCRINE, METABOLIC AND IMMUNITY DISORDER: ICD-10-CM

## 2022-04-27 DIAGNOSIS — Z13.228 SCREENING FOR ENDOCRINE, METABOLIC AND IMMUNITY DISORDER: ICD-10-CM

## 2022-04-27 LAB
CHOLEST SERPL-MCNC: 247 MG/DL
DEPRECATED CALCIDIOL+CALCIFEROL SERPL-MC: 29 UG/L (ref 20–75)
FASTING STATUS PATIENT QL REPORTED: YES
FASTING STATUS PATIENT QL REPORTED: YES
GLUCOSE BLD-MCNC: 101 MG/DL (ref 70–99)
HDLC SERPL-MCNC: 57 MG/DL
LDLC SERPL CALC-MCNC: 141 MG/DL
NONHDLC SERPL-MCNC: 190 MG/DL
TRIGL SERPL-MCNC: 245 MG/DL
TSH SERPL DL<=0.005 MIU/L-ACNC: 2.41 MU/L (ref 0.4–4)

## 2022-04-27 PROCEDURE — 36415 COLL VENOUS BLD VENIPUNCTURE: CPT | Performed by: FAMILY MEDICINE

## 2022-04-27 PROCEDURE — 99397 PER PM REEVAL EST PAT 65+ YR: CPT | Performed by: FAMILY MEDICINE

## 2022-04-27 PROCEDURE — 82947 ASSAY GLUCOSE BLOOD QUANT: CPT | Performed by: FAMILY MEDICINE

## 2022-04-27 PROCEDURE — 82306 VITAMIN D 25 HYDROXY: CPT | Performed by: FAMILY MEDICINE

## 2022-04-27 PROCEDURE — 99214 OFFICE O/P EST MOD 30 MIN: CPT | Mod: 25 | Performed by: FAMILY MEDICINE

## 2022-04-27 PROCEDURE — 84443 ASSAY THYROID STIM HORMONE: CPT | Performed by: FAMILY MEDICINE

## 2022-04-27 PROCEDURE — 80061 LIPID PANEL: CPT | Performed by: FAMILY MEDICINE

## 2022-04-27 PROCEDURE — 88305 TISSUE EXAM BY PATHOLOGIST: CPT | Performed by: PATHOLOGY

## 2022-04-27 RX ORDER — PANTOPRAZOLE SODIUM 20 MG/1
20 TABLET, DELAYED RELEASE ORAL DAILY
Qty: 90 TABLET | Refills: 1 | Status: SHIPPED | OUTPATIENT
Start: 2022-04-27 | End: 2022-11-15

## 2022-04-27 RX ORDER — BETAMETHASONE DIPROPIONATE 0.05 %
GEL (GRAM) TOPICAL 2 TIMES DAILY
Qty: 50 G | Refills: 0 | Status: SHIPPED | OUTPATIENT
Start: 2022-04-27 | End: 2022-05-04

## 2022-04-27 ASSESSMENT — ENCOUNTER SYMPTOMS
JOINT SWELLING: 0
DYSURIA: 0
WEAKNESS: 0
BREAST MASS: 0
MYALGIAS: 0
FREQUENCY: 0
NERVOUS/ANXIOUS: 0
DIARRHEA: 0
ABDOMINAL PAIN: 0
PARESTHESIAS: 1
HEADACHES: 0
SORE THROAT: 0
PALPITATIONS: 0
HEMATURIA: 0
SHORTNESS OF BREATH: 0
FEVER: 0
COUGH: 1
HEARTBURN: 0
NAUSEA: 0
CONSTIPATION: 0
CHILLS: 1
HEMATOCHEZIA: 0
DIZZINESS: 0
EYE PAIN: 0
ARTHRALGIAS: 1

## 2022-04-27 ASSESSMENT — ACTIVITIES OF DAILY LIVING (ADL): CURRENT_FUNCTION: NO ASSISTANCE NEEDED

## 2022-04-29 LAB
PATH REPORT.COMMENTS IMP SPEC: NORMAL
PATH REPORT.COMMENTS IMP SPEC: NORMAL
PATH REPORT.FINAL DX SPEC: NORMAL
PATH REPORT.GROSS SPEC: NORMAL
PATH REPORT.MICROSCOPIC SPEC OTHER STN: NORMAL
PATH REPORT.RELEVANT HX SPEC: NORMAL
PHOTO IMAGE: NORMAL

## 2022-05-01 DIAGNOSIS — L92.0 GRANULOMA ANNULARE: ICD-10-CM

## 2022-05-03 NOTE — TELEPHONE ENCOUNTER
"Pending Prescriptions:                       Disp   Refills    augmented betamethasone dipropionate (DIPR*50 g   0        Sig: APPLY TO AFFECTED AREA TWICE A DAY    Routing refill request to provider for review/approval because:  Pharmacy comment: Alternative Requested:THIS IS 94 DOLLARS THRU INS, PT WONDERING IF LESS EXPENSIVE ALTERNATIVE.    Requested Prescriptions   Pending Prescriptions Disp Refills    augmented betamethasone dipropionate (DIPROLENE) 0.05 % external gel [Pharmacy Med Name: BETAMETHASONE DP AUG 0.05% GEL] 50 g 0     Sig: APPLY TO AFFECTED AREA TWICE A DAY        Topical Steroids and Nonsteroidals Protocol Passed - 5/1/2022  4:35 PM        Passed - Patient is age 6 or older        Passed - Authorizing prescriber's most recent note related to this medication read.     If refill request is for ophthalmic use, please forward request to provider for approval.            Passed - High potency steroid not ordered        Passed - Recent (12 mo) or future (30 days) visit within the authorizing provider's specialty     Patient has had an office visit with the authorizing provider or a provider within the authorizing providers department within the previous 12 mos or has a future within next 30 days. See \"Patient Info\" tab in inbasket, or \"Choose Columns\" in Meds & Orders section of the refill encounter.              Passed - Medication is active on med list                    "

## 2022-05-04 DIAGNOSIS — L92.0 GRANULOMA ANNULARE: Primary | ICD-10-CM

## 2022-05-04 RX ORDER — MOMETASONE FUROATE 1 MG/G
CREAM TOPICAL DAILY
Qty: 50 G | Refills: 0 | Status: SHIPPED | OUTPATIENT
Start: 2022-05-04 | End: 2022-10-11

## 2022-05-04 RX ORDER — BETAMETHASONE DIPROPIONATE 0.05 %
GEL (GRAM) TOPICAL
Qty: 50 G | Refills: 0 | Status: SHIPPED | OUTPATIENT
Start: 2022-05-04 | End: 2022-05-04

## 2022-05-10 ENCOUNTER — ANCILLARY PROCEDURE (OUTPATIENT)
Dept: ULTRASOUND IMAGING | Facility: CLINIC | Age: 77
End: 2022-05-10
Attending: FAMILY MEDICINE
Payer: COMMERCIAL

## 2022-05-10 DIAGNOSIS — Z17.0 MALIGNANT NEOPLASM OF LEFT BREAST IN FEMALE, ESTROGEN RECEPTOR POSITIVE, UNSPECIFIED SITE OF BREAST (H): ICD-10-CM

## 2022-05-10 DIAGNOSIS — D36.7 BENIGN NEOPLASM OF OTHER SPECIFIED SITES: ICD-10-CM

## 2022-05-10 DIAGNOSIS — C50.912 MALIGNANT NEOPLASM OF LEFT BREAST IN FEMALE, ESTROGEN RECEPTOR POSITIVE, UNSPECIFIED SITE OF BREAST (H): ICD-10-CM

## 2022-05-10 DIAGNOSIS — L72.3 SEBACEOUS CYST OF LEFT AXILLA: ICD-10-CM

## 2022-05-10 PROCEDURE — 76642 ULTRASOUND BREAST LIMITED: CPT | Mod: LT

## 2022-07-06 ENCOUNTER — OFFICE VISIT (OUTPATIENT)
Dept: FAMILY MEDICINE | Facility: CLINIC | Age: 77
End: 2022-07-06
Payer: COMMERCIAL

## 2022-07-06 VITALS
RESPIRATION RATE: 18 BRPM | WEIGHT: 193.7 LBS | SYSTOLIC BLOOD PRESSURE: 147 MMHG | TEMPERATURE: 98.6 F | OXYGEN SATURATION: 94 % | BODY MASS INDEX: 30.4 KG/M2 | HEIGHT: 67 IN | DIASTOLIC BLOOD PRESSURE: 89 MMHG | HEART RATE: 76 BPM

## 2022-07-06 DIAGNOSIS — Z01.818 PREOP GENERAL PHYSICAL EXAM: Primary | ICD-10-CM

## 2022-07-06 DIAGNOSIS — H02.30: ICD-10-CM

## 2022-07-06 LAB
ANION GAP SERPL CALCULATED.3IONS-SCNC: 2 MMOL/L (ref 3–14)
BASOPHILS # BLD AUTO: 0.1 10E3/UL (ref 0–0.2)
BASOPHILS NFR BLD AUTO: 1 %
BUN SERPL-MCNC: 14 MG/DL (ref 7–30)
CALCIUM SERPL-MCNC: 9.3 MG/DL (ref 8.5–10.1)
CHLORIDE BLD-SCNC: 106 MMOL/L (ref 94–109)
CO2 SERPL-SCNC: 31 MMOL/L (ref 20–32)
CREAT SERPL-MCNC: 0.81 MG/DL (ref 0.52–1.04)
EOSINOPHIL # BLD AUTO: 0.2 10E3/UL (ref 0–0.7)
EOSINOPHIL NFR BLD AUTO: 2 %
ERYTHROCYTE [DISTWIDTH] IN BLOOD BY AUTOMATED COUNT: 12.7 % (ref 10–15)
GFR SERPL CREATININE-BSD FRML MDRD: 76 ML/MIN/1.73M2
GLUCOSE BLD-MCNC: 110 MG/DL (ref 70–99)
HCT VFR BLD AUTO: 43.3 % (ref 35–47)
HGB BLD-MCNC: 14.2 G/DL (ref 11.7–15.7)
IMM GRANULOCYTES # BLD: 0 10E3/UL
IMM GRANULOCYTES NFR BLD: 0 %
LYMPHOCYTES # BLD AUTO: 2.7 10E3/UL (ref 0.8–5.3)
LYMPHOCYTES NFR BLD AUTO: 37 %
MCH RBC QN AUTO: 30.7 PG (ref 26.5–33)
MCHC RBC AUTO-ENTMCNC: 32.8 G/DL (ref 31.5–36.5)
MCV RBC AUTO: 94 FL (ref 78–100)
MONOCYTES # BLD AUTO: 0.8 10E3/UL (ref 0–1.3)
MONOCYTES NFR BLD AUTO: 11 %
NEUTROPHILS # BLD AUTO: 3.6 10E3/UL (ref 1.6–8.3)
NEUTROPHILS NFR BLD AUTO: 48 %
PLATELET # BLD AUTO: 323 10E3/UL (ref 150–450)
POTASSIUM BLD-SCNC: 4.2 MMOL/L (ref 3.4–5.3)
RBC # BLD AUTO: 4.62 10E6/UL (ref 3.8–5.2)
SODIUM SERPL-SCNC: 139 MMOL/L (ref 133–144)
WBC # BLD AUTO: 7.4 10E3/UL (ref 4–11)

## 2022-07-06 PROCEDURE — 85025 COMPLETE CBC W/AUTO DIFF WBC: CPT | Performed by: PHYSICIAN ASSISTANT

## 2022-07-06 PROCEDURE — 80048 BASIC METABOLIC PNL TOTAL CA: CPT | Performed by: PHYSICIAN ASSISTANT

## 2022-07-06 PROCEDURE — 36415 COLL VENOUS BLD VENIPUNCTURE: CPT | Performed by: PHYSICIAN ASSISTANT

## 2022-07-06 PROCEDURE — 93000 ELECTROCARDIOGRAM COMPLETE: CPT | Performed by: PHYSICIAN ASSISTANT

## 2022-07-06 PROCEDURE — 99214 OFFICE O/P EST MOD 30 MIN: CPT | Performed by: PHYSICIAN ASSISTANT

## 2022-07-06 RX ORDER — BETAMETHASONE DIPROPIONATE 0.05 %
GEL (GRAM) TOPICAL
COMMUNITY
Start: 2022-05-04 | End: 2022-10-11

## 2022-07-06 RX ORDER — VENLAFAXINE HYDROCHLORIDE 37.5 MG/1
37.5 CAPSULE, EXTENDED RELEASE ORAL DAILY
COMMUNITY
End: 2022-08-15

## 2022-07-06 ASSESSMENT — PAIN SCALES - GENERAL: PAINLEVEL: NO PAIN (0)

## 2022-07-06 NOTE — PATIENT INSTRUCTIONS
Just take prescriptions up to day of surgery  Discontinue all vitamins one week prior to surgery   Check your blood pressure and follow up with your primary if blood pressure >140/90     Patient Education    Preparing for Your Surgery  Getting started  A nurse will call you to review your health history and instructions. They will give you an arrival time based on your scheduled surgery time. Please be ready to share:  Your doctor's clinic name and phone number  Your medical, surgical and anesthesia history  A list of allergies and sensitivities  A list of medicines, including herbal treatments and over-the-counter drugs  Whether the patient has a legal guardian (ask how to send us the papers in advance)  Please tell us if you're pregnant--or if there's any chance you might be pregnant. Some surgeries may injure a fetus (unborn baby), so they require a pregnancy test. Surgeries that are safe for a fetus don't always need a test, and you can choose whether to have one.   If you have a child who's having surgery, please ask for a copy of Preparing for Your Child's Surgery.    Preparing for surgery  Within 30 days of surgery: Have a pre-op exam (sometimes called an H&P, or History and Physical). This can be done at a clinic or pre-operative center.  If you're having a , you may not need this exam. Talk to your care team.  At your pre-op exam, talk to your care team about all medicines you take. If you need to stop any medicines before surgery, ask when to start taking them again.  We do this for your safety. Many medicines can make you bleed too much during surgery. Some change how well surgery (anesthesia) drugs work.  Call your insurance company to let them know you're having surgery. (If you don't have insurance, call [phone].)  Call your clinic if there's any change in your health. This includes signs of a cold or flu (sore throat, runny nose, cough, rash, fever). It also includes a scrape or scratch  near the surgery site.  If you have questions on the day of surgery, call your hospital or surgery center.  COVID testing  You may need to be tested for COVID-19 before having surgery. If so, we will give you instructions.  Eating and drinking guidelines  For your safety: Unless your surgeon tells you otherwise, follow the guidelines below.  Eat and drink as usual until 8 hours before surgery. After that, no food or milk.  Drink clear liquids until 2 hours before surgery. These are liquids you can see through, like water, Gatorade and Propel Water. You may also have black coffee and tea (no cream or milk).  Nothing by mouth within 2 hours of surgery. This includes gum, candy and breath mints.  If you drink alcohol: Stop drinking it the night before surgery.  If your care team tells you to take medicine on the morning of surgery, it's okay to take it with a sip of water.  Preventing infection  Shower or bathe the night before and morning of your surgery. Follow the instructions your clinic gave you. (If no instructions, use regular soap.)  Don't shave or clip hair near your surgery site. We'll remove the hair if needed.  Don't smoke or vape the morning of surgery. You may chew nicotine gum up to 2 hours before surgery. A nicotine patch is okay.  Note: Some surgeries require you to completely quit smoking and nicotine. Check with your surgeon.  Your care team will make every effort to keep you safe from infection. We will:  Clean our hands often with soap and water (or an alcohol-based hand rub).  Clean the skin at your surgery site with a special soap that kills germs.  Give you a special gown to keep you warm. (Cold raises the risk of infection.)  Wear special hair covers, masks, gowns and gloves during surgery.  Give antibiotic medicine, if prescribed. Not all surgeries need antibiotics.  What to bring on the day of surgery  Photo ID and insurance card  Copy of your health care directive, if you have one  Glasses  and hearing aides (bring cases)  You can't wear contacts during surgery  Inhaler and eye drops, if you use them (tell us about these when you arrive)  CPAP machine or breathing device, if you use them  A few personal items, if spending the night  If you have . . .  A pacemaker, ICD (cardiac defibrillator) or other implant: Bring the ID card.  An implanted stimulator: Bring the remote control.  A legal guardian: Bring a copy of the certified (court-stamped) guardianship papers.  Please remove any jewelry, including body piercings. Leave jewelry and other valuables at home.  If you're going home the day of surgery  You must have a responsible adult drive you home. They should stay with you overnight as well.  If you don't have someone to stay with you, and you aren't safe to go home alone, we may keep you overnight. Insurance often won't pay for this.  After surgery  If it's hard to control your pain or you need more pain medicine, please call your surgeon's office.  Questions?   If you have any questions for your care team, list them here: _________________________________________________________________________________________________________________________________________________________________________ ____________________________________ ____________________________________ ____________________________________  For informational purposes only. Not to replace the advice of your health care provider. Copyright   2003, 2019 [facility]. All rights reserved. Clinically reviewed by Melissa Jones MD. SIPX [id number] - REV 07/21.

## 2022-07-06 NOTE — PROGRESS NOTES
[address] [address]  Phone: [phone]  Primary Provider: Radha Puentes        PREOPERATIVE EVALUATION:  Today's date: 7/6/2022    Zayra Diop is a 76 year old female who presents for a preoperative evaluation.    Surgical Information:  Surgery/Procedure: Bilateral Blepharoplasty  Surgery Location: [facility]  Surgeon: Dr. Owusu  Surgery Date: 07/13/22  Time of Surgery: TBD  Where patient plans to recover: At home with family  Fax number for surgical facility: [phone]    Type of Anesthesia Anticipated: Local    Assessment & Plan     The proposed surgical procedure is considered LOW risk.    Preop general physical exam  No contraindications to surgery as planned.   Normal hemoglobin and electrolytes.  Checked basic metabolic panel since blood pressure high here today and normal    - REVIEW OF HEALTH MAINTENANCE PROTOCOL ORDERS  - CBC with platelets and differential  - EKG 12-lead complete w/read - Clinics  - Basic metabolic panel  (Ca, Cl, CO2, Creat, Gluc, K, Na, BUN)  - CBC with platelets and differential  - Basic metabolic panel  (Ca, Cl, CO2, Creat, Gluc, K, Na, BUN)    Acquired excess skin of upper eyelid, unspecified laterality  Reason for surgery            Risks and Recommendations:  The patient has the following additional risks and recommendations for perioperative complications:   - No identified additional risk factors other than previously addressed    Medication Instructions:  Patient is to take all scheduled medications on the day of surgery EXCEPT for modifications listed below: encouraged to hold supplements 7 days prior to surgery     RECOMMENDATION:  APPROVAL GIVEN to proceed with proposed procedure, without further diagnostic evaluation.                      Subjective     HPI related to upcoming procedure: has had worsening vision due to excess skin of upper eyelids     Blood  pressure is high here today - reports always high at beginning of appointment but normal after appointment- we checked 3 times and high here- she will check at home and follow up with her PCP if still elevated - she declines treatment at this time       Just started taking venlafaxine- horrendous hot flashes and symptoms not improving  Started antacid pantoproazole and chronic cough resolved  Surgeon recommended pineapple juice 3 days prior to surgery as well as   Arn[place] under the tongue for 3 days     Preop Questions 7/3/2022   1. Have you ever had a heart attack or stroke? No   2. Have you ever had surgery on your heart or blood vessels, such as a stent placement, a coronary artery bypass, or surgery on an artery in your head, neck, heart, or legs? No   3. Do you have chest pain with activity? No   4. Do you have a history of  heart failure? No   5. Do you currently have a cold, bronchitis or symptoms of other infection? No   6. Do you have a cough, shortness of breath, or wheezing? No   7. Do you or anyone in your family have previous history of blood clots? No   8. Do you or does anyone in your family have a serious bleeding problem such as prolonged bleeding following surgeries or cuts? No   9. Have you ever had problems with anemia or been told to take iron pills? No   10. Have you had any abnormal blood loss such as black, tarry or bloody stools, or abnormal vaginal bleeding? No   11. Have you ever had a blood transfusion? No   12. Are you willing to have a blood transfusion if it is medically needed before, during, or after your surgery? Yes   13. Have you or any of your relatives ever had problems with anesthesia? No   14. Do you have sleep apnea, excessive snoring or daytime drowsiness? No   15. Do you have any artifical heart valves or other implanted medical devices like a pacemaker, defibrillator, or continuous glucose monitor? No   16. Do you have artificial joints? No   17. Are you allergic to  latex? No       Health Care Directive:  Patient has a Health Care Directive on file      Preoperative Review of :   reviewed - no record of controlled substances prescribed.      Status of Chronic Conditions:  HYPERLIPIDEMIA - Patient has a long history of significant Hyperlipidemia requiring medication for treatment with recent fair control. Patient reports no problems or side effects with the medication.       Review of Systems  CONSTITUTIONAL: NEGATIVE for fever, chills, change in weight  INTEGUMENTARY/SKIN: NEGATIVE for worrisome rashes, moles or lesions  EYES: see hpi   ENT/MOUTH: NEGATIVE for ear, mouth and throat problems  RESP: NEGATIVE for significant cough or SOB  CV: NEGATIVE for chest pain, palpitations or peripheral edema  GI: NEGATIVE for nausea, abdominal pain, heartburn, or change in bowel habits  : NEGATIVE for frequency, dysuria, or hematuria  MUSCULOSKELETAL: NEGATIVE for significant arthralgias or myalgia  NEURO: NEGATIVE for weakness, dizziness or paresthesias  ENDOCRINE: NEGATIVE for temperature intolerance, skin/hair changes  HEME: NEGATIVE for bleeding problems  PSYCHIATRIC: NEGATIVE for changes in mood or affect    Patient Active Problem List    Diagnosis Date Noted     Chronic cough 06/13/2016     Priority: Medium     pulm eval 2016- upper airway cough syndrome  ENT 4/2016- hyperemeia, ? Atypical gerd, continue nasal steroid spray, GI eval.   GI eval 6/7/16- ? Atypical gerd- egd and bravo study planned       Advance care planning 04/26/2013     Priority: Medium     Advance Care Planning: Receipt of ACP document:  Received: Health Care Directive which was witnessed or notarized on 5-29-13.  Document previously scanned on 5-31-13.  Validation form completed and sent to be scanned.  Code Status reflects choices in most recent ACP document.  Confirmed/documented designated decision maker(s). See permanent comments section of demographics in clinical tab. View document(s) and details by  clicking on code status. Added by Itzel Lin RN, System ACP Coordinator on 3/13/2014.  Advance Care Planning: ACP Review and Resources Provided:  Reviewed chart for advance care plan.  Zayra Diop has an up to date advance care plan on file. See additional documentation in Problem List and click on code status for document details. Confirmed/documented designated decision maker(s). See permanent comments section of demographics in clinical tab. Added by Carmina Au on 5/29/2013               Vitamin D deficiency disease 02/13/2013     Priority: Medium     Hyperlipidemia LDL goal <130 12/29/2012     Priority: Medium     Breast cancer (H) 12/18/2012     Priority: Medium     1999- lumpectomy right breast followed by radiation for 6 weeks.   Left breast mastectomy 2 years. Has not followed with oncolologist.        Family history of thyroid disease 12/18/2012     Priority: Medium     2 sisters.        Health Care Home 11/09/2012     Priority: Medium     Corin Abdalla RN-PHN  FPA / [facility]   [phone]   DX V65.8 REPLACED WITH [id number] HEALTH CARE HOME (04/08/2013)        Past Medical History:   Diagnosis Date     Breast cancer (H)      Infertility, female      Premature menopause age 42     Past Surgical History:   Procedure Laterality Date     APPENDECTOMY  1969     CATARACT IOL, RT/LT      laser treatment following     LAPAROSCOPY  1972    for infertility     LUMPECTOMY BREAST  1999    right     MASTECTOMY  6/2010    left     Current Outpatient Medications   Medication Sig Dispense Refill     mometasone (ELOCON) 0.1 % external cream Apply topically daily 50 g 0     multivitamin (ONE-DAILY) tablet Take 1 tablet by mouth daily 90 tablet 3     pantoprazole (PROTONIX) 20 MG EC tablet Take 1 tablet (20 mg) by mouth daily 90 tablet 1     Red Yeast Rice Extract (RED YEAST RICE PO)        triamcinolone (KENALOG) 0.1 % external ointment Apply topically 2 times daily 30 g 1     VITAMIN D PO  "         Allergies   Allergen Reactions     Propofol Itching        Social History     Tobacco Use     Smoking status: Never Smoker     Smokeless tobacco: Never Used   Substance Use Topics     Alcohol use: Yes     Comment: social, 2 drinks/week     Family History   Problem Relation Age of Onset     Breast Cancer Sister      Breast Cancer Sister      Heart Disease Sister 74        stents     Alzheimer Disease Father      Kidney Cancer Brother         Kidney     Cancer Mother         Hodgkins     Cardiovascular Brother         has Heart problems had a TIA      History   Drug Use No         Objective     BP (!) 147/89 (BP Location: Right arm, Patient Position: Sitting, Cuff Size: Adult Regular)   Pulse 76   Temp 98.6  F (37  C) (Oral)   Resp 18   Ht 1.702 m (5' 7\")   Wt 87.9 kg (193 lb 11.2 oz)   LMP 11/10/1986 (Exact Date)   SpO2 94%   BMI 30.34 kg/m      Physical Exam    GENERAL APPEARANCE: healthy, alert and no distress     EYES: EOMI, PERRL     HENT: ear canals and TM's normal and nose and mouth without ulcers or lesions     NECK: no adenopathy, no asymmetry, masses, or scars and thyroid normal to palpation     RESP: lungs clear to auscultation - no rales, rhonchi or wheezes     CV: regular rates and rhythm, normal S1 S2, no S3 or S4 and no murmur, click or rub     ABDOMEN:  soft, nontender, no HSM or masses and bowel sounds normal     MS: extremities normal- no gross deformities noted, no evidence of inflammation in joints, FROM in all extremities.     SKIN: no suspicious lesions or rashes     NEURO: Normal strength and tone, sensory exam grossly normal, mentation intact and speech normal     PSYCH: mentation appears normal. and affect normal/bright     LYMPHATICS: No cervical adenopathy    Recent Labs   Lab Test 05/26/21  0859      POTASSIUM 4.5   CR 0.89        Diagnostics:  Recent Results (from the past 24 hour(s))   Basic metabolic panel  (Ca, Cl, CO2, Creat, Gluc, K, Na, BUN)    Collection Time: " 07/06/22  3:51 PM   Result Value Ref Range    Sodium 139 133 - 144 mmol/L    Potassium 4.2 3.4 - 5.3 mmol/L    Chloride 106 94 - 109 mmol/L    Carbon Dioxide (CO2) 31 20 - 32 mmol/L    Anion Gap 2 (L) 3 - 14 mmol/L    Urea Nitrogen 14 7 - 30 mg/dL    Creatinine 0.81 0.52 - 1.04 mg/dL    Calcium 9.3 8.5 - 10.1 mg/dL    Glucose 110 (H) 70 - 99 mg/dL    GFR Estimate 76 >60 mL/min/1.73m2   CBC with platelets and differential    Collection Time: 07/06/22  3:51 PM   Result Value Ref Range    WBC Count 7.4 4.0 - 11.0 10e3/uL    RBC Count 4.62 3.80 - 5.20 10e6/uL    Hemoglobin 14.2 11.7 - 15.7 g/dL    Hematocrit 43.3 35.0 - 47.0 %    MCV 94 78 - 100 fL    MCH 30.7 26.5 - 33.0 pg    MCHC 32.8 31.5 - 36.5 g/dL    RDW 12.7 10.0 - 15.0 %    Platelet Count 323 150 - 450 10e3/uL    % Neutrophils 48 %    % Lymphocytes 37 %    % Monocytes 11 %    % Eosinophils 2 %    % Basophils 1 %    % Immature Granulocytes 0 %    Absolute Neutrophils 3.6 1.6 - 8.3 10e3/uL    Absolute Lymphocytes 2.7 0.8 - 5.3 10e3/uL    Absolute Monocytes 0.8 0.0 - 1.3 10e3/uL    Absolute Eosinophils 0.2 0.0 - 0.7 10e3/uL    Absolute Basophils 0.1 0.0 - 0.2 10e3/uL    Absolute Immature Granulocytes 0.0 <=0.4 10e3/uL      EKG: sinus bradycardia, normal axis, normal intervals, no acute ST/T changes c/w ischemia, no LVH by voltage criteria    Revised Cardiac Risk Index (RCRI):  The patient has the following serious cardiovascular risks for perioperative complications:   - No serious cardiac risks = 0 points     RCRI Interpretation: 0 points: Class I (very low risk - 0.4% complication rate)           Signed Electronically by: Jo Banks PA-C  Copy of this evaluation report is provided to requesting physician.

## 2022-07-07 NOTE — RESULT ENCOUNTER NOTE
Dear Juliann  Your electrolytes and kidney function were normal.   Your blood sugar was marginally elevated but not concerning since it was not a fasting specimen.  Your blood counts and hemoglobin were normal.    Please call or MyChart my office with any questions or concerns.   Jo Banks, PAC

## 2022-08-15 DIAGNOSIS — N95.1 MENOPAUSAL SYNDROME (HOT FLASHES): Primary | ICD-10-CM

## 2022-08-17 RX ORDER — VENLAFAXINE HYDROCHLORIDE 37.5 MG/1
37.5 CAPSULE, EXTENDED RELEASE ORAL DAILY
Qty: 90 CAPSULE | Refills: 0 | Status: SHIPPED | OUTPATIENT
Start: 2022-08-17 | End: 2022-10-11

## 2022-08-17 NOTE — TELEPHONE ENCOUNTER
Pending Prescriptions:                       Disp   Refills    venlafaxine (EFFEXOR XR) 37.5 MG 24 hr cap*                Sig: Take 1 capsule (37.5 mg) by mouth daily        Routing refill request to provider for review/approval because:  Medication is reported/historical

## 2022-08-28 ENCOUNTER — E-VISIT (OUTPATIENT)
Dept: FAMILY MEDICINE | Facility: CLINIC | Age: 77
End: 2022-08-28
Payer: COMMERCIAL

## 2022-08-28 DIAGNOSIS — N39.0 ACUTE UTI (URINARY TRACT INFECTION): Primary | ICD-10-CM

## 2022-08-28 PROCEDURE — 99421 OL DIG E/M SVC 5-10 MIN: CPT | Performed by: FAMILY MEDICINE

## 2022-08-30 ENCOUNTER — TELEPHONE (OUTPATIENT)
Dept: FAMILY MEDICINE | Facility: CLINIC | Age: 77
End: 2022-08-30

## 2022-08-30 RX ORDER — SULFAMETHOXAZOLE/TRIMETHOPRIM 800-160 MG
1 TABLET ORAL 2 TIMES DAILY
Qty: 6 TABLET | Refills: 0 | Status: SHIPPED | OUTPATIENT
Start: 2022-08-30 | End: 2022-09-02

## 2022-08-30 NOTE — TELEPHONE ENCOUNTER
Symptoms started Sunday.  Has had several UTI's in past and feels like these symptoms are the same.    Burning sensation, frequency with little urine output.    No fever or blood in urine    Taking azo but is still having the UTI problems.    Thought she mycharted you on Sunday but I didn't see anything.    Pharmacy is [facility] in [place].    Do you want an evisit or virtual?    Noreen Vargas RN  [facility] ~ Registered Nurse  Clinic Triage ~ [place] [facility]  August 30, 2022

## 2022-08-30 NOTE — PATIENT INSTRUCTIONS
Dear Zayra Diop    After reviewing your responses, I've been able to diagnose you with a urinary tract infection, which is a common infection of the bladder with bacteria.  This is not a sexually transmitted infection, though urinating immediately after intercourse can help prevent infections.  Drinking lots of fluids is also helpful to clear your current infection and prevent the next one.      I have sent a prescription for antibiotics to your pharmacy to treat this infection.    It is important that you take all of your prescribed medication even if your symptoms are improving after a few doses.  Taking all of your medicine helps prevent the symptoms from returning.     If your symptoms worsen, you develop pain in your back or stomach, develop fevers, or are not improving in 5 days, please contact your primary care provider for an appointment or visit any of our convenient Walk-in or Urgent Care Centers to be seen, which can be found on our website here.    Thanks again for choosing us as your health care partner,    Radha Puentes MD

## 2022-08-30 NOTE — TELEPHONE ENCOUNTER
Patient calling on this again and would like this sent urgently.     Patient would like a call when medication is sent to pharmacy.     JESICA BeckerN, RN

## 2022-10-11 ENCOUNTER — NURSE TRIAGE (OUTPATIENT)
Dept: FAMILY MEDICINE | Facility: CLINIC | Age: 77
End: 2022-10-11

## 2022-10-11 ENCOUNTER — OFFICE VISIT (OUTPATIENT)
Dept: FAMILY MEDICINE | Facility: CLINIC | Age: 77
End: 2022-10-11
Payer: COMMERCIAL

## 2022-10-11 VITALS
TEMPERATURE: 98 F | HEART RATE: 58 BPM | WEIGHT: 195.1 LBS | HEIGHT: 68 IN | RESPIRATION RATE: 16 BRPM | OXYGEN SATURATION: 96 % | BODY MASS INDEX: 29.57 KG/M2 | SYSTOLIC BLOOD PRESSURE: 158 MMHG | DIASTOLIC BLOOD PRESSURE: 90 MMHG

## 2022-10-11 DIAGNOSIS — I10 BENIGN ESSENTIAL HYPERTENSION: ICD-10-CM

## 2022-10-11 DIAGNOSIS — N30.01 ACUTE CYSTITIS WITH HEMATURIA: Primary | ICD-10-CM

## 2022-10-11 DIAGNOSIS — N89.8 VAGINAL DISCHARGE: ICD-10-CM

## 2022-10-11 LAB
BACTERIA #/AREA URNS HPF: ABNORMAL /HPF
CLUE CELLS: NORMAL
RBC #/AREA URNS AUTO: ABNORMAL /HPF
SQUAMOUS #/AREA URNS AUTO: ABNORMAL /LPF
TRICHOMONAS, WET PREP: NORMAL
WBC #/AREA URNS AUTO: ABNORMAL /HPF
WBC'S/HIGH POWER FIELD, WET PREP: NORMAL
YEAST, WET PREP: NORMAL

## 2022-10-11 PROCEDURE — 99214 OFFICE O/P EST MOD 30 MIN: CPT | Performed by: FAMILY MEDICINE

## 2022-10-11 PROCEDURE — 87086 URINE CULTURE/COLONY COUNT: CPT | Performed by: FAMILY MEDICINE

## 2022-10-11 PROCEDURE — 87210 SMEAR WET MOUNT SALINE/INK: CPT | Performed by: FAMILY MEDICINE

## 2022-10-11 PROCEDURE — 81015 MICROSCOPIC EXAM OF URINE: CPT | Performed by: FAMILY MEDICINE

## 2022-10-11 RX ORDER — AMLODIPINE BESYLATE 5 MG/1
5 TABLET ORAL DAILY
Qty: 90 TABLET | Refills: 1 | Status: SHIPPED | OUTPATIENT
Start: 2022-10-11 | End: 2023-04-07

## 2022-10-11 RX ORDER — CEFDINIR 300 MG/1
300 CAPSULE ORAL 2 TIMES DAILY
Qty: 20 CAPSULE | Refills: 0 | Status: SHIPPED | OUTPATIENT
Start: 2022-10-11 | End: 2022-10-21

## 2022-10-11 ASSESSMENT — PAIN SCALES - GENERAL: PAINLEVEL: NO PAIN (0)

## 2022-10-11 ASSESSMENT — ENCOUNTER SYMPTOMS: HEADACHES: 1

## 2022-10-11 NOTE — PROGRESS NOTES
"  Assessment & Plan     Acute cystitis with hematuria  Push fluids, await urine culture.  - UA with Microscopic reflex to Culture - lab collect; Future  - cefdinir (OMNICEF) 300 MG capsule; Take 1 capsule (300 mg) by mouth 2 times daily for 10 days  - Urine Microscopic  - Urine Culture    Vaginal discharge  - Wet prep - lab collect; Future  - Wet prep - lab collect    Benign essential hypertension  New diagnosis, history of white coat syndrome  Wean off venlafaxine, started for hot flashes and not really effective  orders and follow up as documented in [facility], reviewed diet, exercise and weight control, recommended sodium restriction, cardiovascular risk and specific lipid/LDL goals reviewed.    - amLODIPine (NORVASC) 5 MG tablet; Take 1 tablet (5 mg) by mouth daily      BMI:   Estimated body mass index is 30.09 kg/m  as calculated from the following:    Height as of this encounter: 1.715 m (5' 7.52\").    Weight as of this encounter: 88.5 kg (195 lb 1.6 oz).   Weight management plan: Discussed healthy diet and exercise guidelines, this will further improve blood pressure.        No follow-ups on file.    Radha Puentes MD  [facility] ALEX Humphreys is a 76 year old accompanied by her self, presenting for the following health issues:  UTI      UTI  This is a recurrent problem. The current episode started today. The problem occurs constantly. The problem has been gradually worsening. Associated symptoms include headaches and urinary symptoms. Nothing aggravates the symptoms. She has tried NSAIDs for the symptoms. The treatment provided mild relief.   History of Present Illness       Reason for visit:  Uti infection  Symptom onset:  Today  Symptoms include:  Tingling down arms when using the bathroom  Symptom intensity:  Mild    She eats 2-3 servings of fruits and vegetables daily.She consumes 0 sweetened beverage(s) daily.She exercises with enough effort to increase her heart rate 20 " "to 29 minutes per day.  She exercises with enough effort to increase her heart rate 3 or less days per week.   She is taking medications regularly.           Review of Systems   Neurological: Positive for headaches.     Objective    BP (!) 158/90   Pulse 58   Temp 98  F (36.7  C) (Temporal)   Resp 16   Ht 1.715 m (5' 7.52\")   Wt 88.5 kg (195 lb 1.6 oz)   LMP 11/10/1986 (Exact Date)   SpO2 96%   BMI 30.09 kg/m    Body mass index is 30.09 kg/m .  Physical Exam   GENERAL: healthy, alert and no distress  EYES: Eyes grossly normal to inspection, PERRL and conjunctivae and sclerae normal  HENT: ear canals and TM's normal, nose and mouth without ulcers or lesions  NECK: no adenopathy, no asymmetry, masses, or scars and thyroid normal to palpation  RESP: lungs clear to auscultation - no rales, rhonchi or wheezes  CV: regular rate and rhythm, normal S1 S2, no S3 or S4, no murmur, click or rub, no peripheral edema and peripheral pulses strong  ABDOMEN: soft, nontender, no hepatosplenomegaly, no masses and bowel sounds normal  MS: no gross musculoskeletal defects noted, no edema  NEURO: Normal strength and tone, mentation intact and speech normal  PSYCH: mentation appears normal, affect normal/bright              "

## 2022-10-11 NOTE — TELEPHONE ENCOUNTER
Tingling down both arms into hands. Frequency. Burning    Taking Azo. Reduced frequency     Patient calling with UTI symptoms. Just completed a 7 day course of antibiotics for UTI.   Patient says she is having frequency and burning with urination. Hematuria this morning. She has been having frequent UTI's lately and says she also experiences a tingling down both her arms and hands as a symptom, this developed today.     Per protocol, patient to be seen today. Appointment scheduled.     Gillian MOONEYN, RN     Reason for Disposition    Urinating more frequently than usual (i.e., frequency)    Additional Information    Negative: Shock suspected (e.g., cold/pale/clammy skin, too weak to stand, low BP, rapid pulse)    Negative: Sounds like a life-threatening emergency to the triager    Negative: Unable to urinate (or only a few drops) > 4 hours and bladder feels very full (e.g., palpable bladder or strong urge to urinate)    Negative: Decreased urination and drinking very little and dehydration suspected (e.g., dark urine, no urine > 12 hours, very dry mouth, very lightheaded)    Negative: Patient sounds very sick or weak to the triager    Negative: Fever > 100.4 F  (38.0 C)    Negative: Side (flank) or lower back pain present    Negative: Can't control passage of urine (i.e., urinary incontinence) and new-onset (< 2 weeks) or worsening    Protocols used: URINARY SYMPTOMS-A-[place]

## 2022-10-13 LAB — BACTERIA UR CULT: NO GROWTH

## 2022-10-16 ENCOUNTER — HEALTH MAINTENANCE LETTER (OUTPATIENT)
Age: 77
End: 2022-10-16

## 2022-11-11 DIAGNOSIS — K21.00 GASTROESOPHAGEAL REFLUX DISEASE WITH ESOPHAGITIS, UNSPECIFIED WHETHER HEMORRHAGE: ICD-10-CM

## 2022-11-11 DIAGNOSIS — R05.3 CHRONIC COUGH: ICD-10-CM

## 2022-11-15 RX ORDER — PANTOPRAZOLE SODIUM 20 MG/1
TABLET, DELAYED RELEASE ORAL
Qty: 90 TABLET | Refills: 1 | Status: SHIPPED | OUTPATIENT
Start: 2022-11-15 | End: 2023-06-09

## 2023-04-06 DIAGNOSIS — I10 BENIGN ESSENTIAL HYPERTENSION: ICD-10-CM

## 2023-04-07 RX ORDER — AMLODIPINE BESYLATE 5 MG/1
TABLET ORAL
Qty: 90 TABLET | Refills: 1 | Status: SHIPPED | OUTPATIENT
Start: 2023-04-07 | End: 2023-11-27

## 2023-04-07 NOTE — TELEPHONE ENCOUNTER
"Pending Prescriptions:                       Disp   Refills    amLODIPine (NORVASC) 5 MG tablet [Pharmacy*90 tab*1        Sig: TAKE 1 TABLET(5 MG) BY MOUTH DAILY    Routing refill request to provider for review/approval because:  Requested Prescriptions   Pending Prescriptions Disp Refills    amLODIPine (NORVASC) 5 MG tablet [Pharmacy Med Name: AMLODIPINE BESYLATE 5MG TABLETS] 90 tablet 1     Sig: TAKE 1 TABLET(5 MG) BY MOUTH DAILY       Calcium Channel Blockers Protocol  Failed - 4/6/2023  8:02 PM        Failed - Blood pressure under 140/90 in past 12 months     BP Readings from Last 3 Encounters:   10/11/22 (!) 158/90   07/06/22 (!) 147/89   04/27/22 (!) 146/92                 Passed - Recent (12 mo) or future (30 days) visit within the authorizing provider's specialty     Patient has had an office visit with the authorizing provider or a provider within the authorizing providers department within the previous 12 mos or has a future within next 30 days. See \"Patient Info\" tab in inbasket, or \"Choose Columns\" in Meds & Orders section of the refill encounter.              Passed - Medication is active on med list        Passed - Patient is age 18 or older        Passed - No active pregnancy on record        Passed - Normal serum creatinine on file in past 12 months     Recent Labs   Lab Test 07/06/22  1551   CR 0.81       Ok to refill medication if creatinine is low          Passed - No positive pregnancy test in past 12 months           amLODIPine (NORVASC) 5 MG tablet 90 tablet 1 10/11/2022  --   Sig - Route: Take 1 tablet (5 mg) by mouth daily - Oral   Sent to pharmacy as: amLODIPine Besylate 5 MG Oral Tablet (NORVASC)   Class: E-Prescribe   Order: [id number]   E-Prescribing Status: Receipt confirmed by pharmacy (10/11/2022  3:47 PM CDT)     Kaylie Stokes RN on 4/7/2023 at 10:26 AM        "

## 2023-04-20 ENCOUNTER — PATIENT OUTREACH (OUTPATIENT)
Dept: CARE COORDINATION | Facility: CLINIC | Age: 78
End: 2023-04-20
Payer: COMMERCIAL

## 2023-04-26 ENCOUNTER — NURSE TRIAGE (OUTPATIENT)
Dept: FAMILY MEDICINE | Facility: CLINIC | Age: 78
End: 2023-04-26
Payer: COMMERCIAL

## 2023-04-26 NOTE — TELEPHONE ENCOUNTER
RN Triage    Patient Contact    Attempt # 1    Was call answered?  No.  Left message on voicemail with information to call me back.    Please advise patient of below message from provider on call back.    JESICA IbarraN, RN  [facility] ~ Registered Nurse  Clinic Triage ~ [place] [facility]  April 26, 2023

## 2023-04-26 NOTE — TELEPHONE ENCOUNTER
S: UTI symptom's.     B: Patient is calling in with complaint of UTI symptom's. She said she knows she has a UTI. She gets them frequently.  Patient reports today she is having increased urgency and frequency. She did have pink tinged urine today. She said she has numbness and tingling in her arms when she is voiding. It starts when her stream starts and resolves when done voiding. Denies any n/t any other time. She said this is a common symptom when she has a UTI. Denies any vision changes, slurred or mumbled speech. Denies headache. Denies weakness on either side of body. States is not a stroke. Denies low back pain. Denies fevers.     A: Advised appointment today per protocol. RN reviewed red flag symptoms with patient and when to seek emergency care.     R: Patient declines at this time. She wants message sent to PCP (she is aware she is out of the office today) to ask about ordering a UA/UC or ordering an antibiotics. She would like this sent to [facility]. RN did discuss care options, virtual visit, E-Visit,urgent care, etc.  Patient declined all, wants message sent to provider.     Provider: Patient requesting an antibiotics for UTI. pharmacy raúl'd up. Would you like UA/UC? Have her evaluated by provider? Please review and give recommendations.     Reason for Disposition    Urinating more frequently than usual (i.e., frequency)    Additional Information    Negative: Shock suspected (e.g., cold/pale/clammy skin, too weak to stand, low BP, rapid pulse)    Negative: Sounds like a life-threatening emergency to the triager    Negative: Followed a female genital area injury (e.g., vagina, vulva)    Negative: Followed a male genital area injury (penis, scrotum)    Negative: Vaginal discharge    Negative: Pus (white, yellow) or bloody discharge from end of penis    Negative: Pain or burning with passing urine (urination) and pregnant    Negative: Pain or burning with passing urine (urination) and female    Negative:  Pain or burning with passing urine (urination) and male    Negative: Pain or itching in the vulvar area    Negative: Pain in scrotum is main symptom    Negative: Blood in the urine is main symptom    Negative: Symptoms arising from use of a urinary catheter (e.g., coude, Mejia)    Negative: Decreased urination and drinking very little and dehydration suspected (e.g., dark urine, no urine > 12 hours, very dry mouth, very lightheaded)    Negative: Patient sounds very sick or weak to the triager    Negative: Fever > 100.4 F  (38.0 C)    Negative: Side (flank) or lower back pain present    Negative: Can't control passage of urine (i.e., urinary incontinence) and new-onset (< 2 weeks) or worsening    Negative: Unable to urinate (or only a few drops) > 4 hours and bladder feels very full (e.g., palpable bladder or strong urge to urinate)    Protocols used: URINARY SYMPTOMS-A-[place]

## 2023-04-27 NOTE — TELEPHONE ENCOUNTER
Patient returned call and advised she needs some type of visit for possible UTI.   Patient upset with this response and states she knows its a UTI and just wants medication.   Explained the option of submitting an e-visit, but cannot confirm if a UA will be needed or not.   Patient upset and will either submit an e-visit or go to urgent care, then ended the call.     Gillian MOONEYN, RN  [facility] [place]

## 2023-06-01 ENCOUNTER — HEALTH MAINTENANCE LETTER (OUTPATIENT)
Age: 78
End: 2023-06-01

## 2023-06-09 DIAGNOSIS — K21.00 GASTROESOPHAGEAL REFLUX DISEASE WITH ESOPHAGITIS, UNSPECIFIED WHETHER HEMORRHAGE: ICD-10-CM

## 2023-06-09 DIAGNOSIS — R05.3 CHRONIC COUGH: ICD-10-CM

## 2023-06-09 RX ORDER — PANTOPRAZOLE SODIUM 20 MG/1
TABLET, DELAYED RELEASE ORAL
Qty: 90 TABLET | Refills: 0 | Status: SHIPPED | OUTPATIENT
Start: 2023-06-09 | End: 2023-09-11

## 2023-06-13 ENCOUNTER — OFFICE VISIT (OUTPATIENT)
Dept: FAMILY MEDICINE | Facility: CLINIC | Age: 78
End: 2023-06-13
Payer: COMMERCIAL

## 2023-06-13 VITALS
WEIGHT: 200 LBS | DIASTOLIC BLOOD PRESSURE: 82 MMHG | OXYGEN SATURATION: 96 % | RESPIRATION RATE: 18 BRPM | HEART RATE: 71 BPM | SYSTOLIC BLOOD PRESSURE: 134 MMHG | BODY MASS INDEX: 30.31 KG/M2 | HEIGHT: 68 IN | TEMPERATURE: 97.9 F

## 2023-06-13 DIAGNOSIS — Z17.0 MALIGNANT NEOPLASM OF LEFT BREAST IN FEMALE, ESTROGEN RECEPTOR POSITIVE, UNSPECIFIED SITE OF BREAST (H): ICD-10-CM

## 2023-06-13 DIAGNOSIS — C50.912 MALIGNANT NEOPLASM OF LEFT BREAST IN FEMALE, ESTROGEN RECEPTOR POSITIVE, UNSPECIFIED SITE OF BREAST (H): ICD-10-CM

## 2023-06-13 DIAGNOSIS — I10 BENIGN ESSENTIAL HYPERTENSION: Primary | ICD-10-CM

## 2023-06-13 DIAGNOSIS — N30.90 RECURRENT CYSTITIS WITH NEGATIVE CULTURE: ICD-10-CM

## 2023-06-13 DIAGNOSIS — K11.20 PAROTITIS NOT DUE TO MUMPS: ICD-10-CM

## 2023-06-13 DIAGNOSIS — Z12.11 SCREEN FOR COLON CANCER: ICD-10-CM

## 2023-06-13 DIAGNOSIS — Z23 ENCOUNTER FOR IMMUNIZATION: ICD-10-CM

## 2023-06-13 PROCEDURE — 80061 LIPID PANEL: CPT | Performed by: FAMILY MEDICINE

## 2023-06-13 PROCEDURE — 99214 OFFICE O/P EST MOD 30 MIN: CPT | Mod: 25 | Performed by: FAMILY MEDICINE

## 2023-06-13 PROCEDURE — 90715 TDAP VACCINE 7 YRS/> IM: CPT | Performed by: FAMILY MEDICINE

## 2023-06-13 PROCEDURE — 90471 IMMUNIZATION ADMIN: CPT | Performed by: FAMILY MEDICINE

## 2023-06-13 PROCEDURE — 36415 COLL VENOUS BLD VENIPUNCTURE: CPT | Performed by: FAMILY MEDICINE

## 2023-06-13 PROCEDURE — 80053 COMPREHEN METABOLIC PANEL: CPT | Performed by: FAMILY MEDICINE

## 2023-06-13 RX ORDER — CEPHALEXIN 500 MG/1
500 CAPSULE ORAL 2 TIMES DAILY PRN
Qty: 20 CAPSULE | Refills: 0 | Status: SHIPPED | OUTPATIENT
Start: 2023-06-13 | End: 2024-06-18

## 2023-06-13 RX ORDER — PREDNISONE 20 MG/1
20 TABLET ORAL DAILY
Qty: 5 TABLET | Refills: 0 | Status: SHIPPED | OUTPATIENT
Start: 2023-06-13 | End: 2023-06-18

## 2023-06-13 ASSESSMENT — PAIN SCALES - GENERAL: PAINLEVEL: SEVERE PAIN (6)

## 2023-06-13 NOTE — NURSING NOTE
Prior to immunization administration, verified patients identity using patient s name and date of birth. Please see Immunization Activity for additional information.     Screening Questionnaire for Adult Immunization    Are you sick today?   No   Do you have allergies to medications, food, a vaccine component or latex?   No   Have you ever had a serious reaction after receiving a vaccination?   No   Do you have a long-term health problem with heart, lung, kidney, or metabolic disease (e.g., diabetes), asthma, a blood disorder, no spleen, complement component deficiency, a cochlear implant, or a spinal fluid leak?  Are you on long-term aspirin therapy?   No   Do you have cancer, leukemia, HIV/AIDS, or any other immune system problem?   No   Do you have a parent, brother, or sister with an immune system problem?   No   In the past 3 months, have you taken medications that affect  your immune system, such as prednisone, other steroids, or anticancer drugs; drugs for the treatment of rheumatoid arthritis, Crohn s disease, or psoriasis; or have you had radiation treatments?   No   Have you had a seizure, or a brain or other nervous system problem?   No   During the past year, have you received a transfusion of blood or blood    products, or been given immune (gamma) globulin or antiviral drug?   No   For women: Are you pregnant or is there a chance you could become       pregnant during the next month?   No   Have you received any vaccinations in the past 4 weeks?   No     Immunization questionnaire answers were all negative.      Injection of Tdap given by Nay Blakely. Patient instructed to remain in clinic for 15 minutes afterwards, and to report any adverse reactions.     Screening performed by Nay Blakely on 6/13/2023 at 12:01 PM.

## 2023-06-13 NOTE — PROGRESS NOTES
"  Assessment & Plan     Benign essential hypertension  Stable continue present management.  - Lipid panel reflex to direct LDL Fasting; Future  - Comprehensive metabolic panel  - Lipid panel reflex to direct LDL Fasting    Recurrent cystitis with negative culture  - cephALEXin (KEFLEX) 500 MG capsule; Take 1 capsule (500 mg) by mouth 2 times daily as needed (urinary)    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, estrogen receptor positive, unspecified site of breast (H)  1999- lumpectomy right breast followed by radiation for 6 weeks.   Left breast mastectomy 2 years. Has not followed with oncolologist.    Encounter for immunization  - TDAP 10-64Y (ADACEL,BOOSTRIX)    Screen for colon cancer  - Colonoscopy Screening  Referral; Future    Parotitis not due to mumps  Gentle massage, warm compresses, can suck on lemon juice.  See order for ultrasound.  - amoxicillin-clavulanate (AUGMENTIN) 875-125 MG tablet; Take 1 tablet by mouth 2 times daily for 3 days  - predniSONE (DELTASONE) 20 MG tablet; Take 1 tablet (20 mg) by mouth daily for 5 days  - US Head Neck Soft Tissue; Future    BMI:   Estimated body mass index is 30.41 kg/m  as calculated from the following:    Height as of this encounter: 1.727 m (5' 8\").    Weight as of this encounter: 90.7 kg (200 lb).   Weight management plan: Discussed healthy diet and exercise guidelines      Radha Puentes MD  [facility] ALEX Humphreys is a 77 year old, presenting for the following health issues:  ER F/U        6/13/2023    10:47 AM   Additional Questions   Roomed by VE   Accompanied by SELF         6/13/2023    10:47 AM   Patient Reported Additional Medications   Patient reports taking the following new medications Augmentin 875-125     History of Present Illness       Reason for visit:  Lump    She eats 2-3 servings of fruits and vegetables daily.She consumes 0 sweetened beverage(s) daily.She exercises with enough effort to increase " "her heart rate 30 to 60 minutes per day.  She exercises with enough effort to increase her heart rate 5 days per week.   She is taking medications regularly.       ED/UC Followup:    Facility:  [facility]  Date of visit: 06/08/2023  Patient is here for follow-up with concerns of a inferior mandibula mass noticed last 1 week ago.  She was seen in urgent care and is currently on Augmentin.  There is still pain and discomfort with swallowing.  Area is not erythematous, she denies fever or chills, she denies dental pain.    Hyperlipidemia Follow-Up      Are you regularly taking any medication or supplement to lower your cholesterol?   No    Are you having muscle aches or other side effects that you think could be caused by your cholesterol lowering medication?  No        Review of Systems   Constitutional, HEENT, cardiovascular, pulmonary, GI, , musculoskeletal, neuro, skin, endocrine and psych systems are negative, except as otherwise noted.      Objective    /82 (BP Location: Left arm, Patient Position: Chair, Cuff Size: Adult Regular)   Pulse 71   Temp 97.9  F (36.6  C) (Temporal)   Resp 18   Ht 1.727 m (5' 8\")   Wt 90.7 kg (200 lb)   LMP 11/10/1986 (Exact Date)   SpO2 96%   Breastfeeding No   BMI 30.41 kg/m    Body mass index is 30.41 kg/m .  Physical Exam   GENERAL: healthy, alert and no distress  EYES: Eyes grossly normal to inspection, PERRL and conjunctivae and sclerae normal  HENT: ear canals and TM's normal, nose and mouth without ulcers or lesions  NECK: no adenopathy, no asymmetry, masses, or scars and thyroid normal to palpation  NECK: Left submandibular gland enlargement, it is hard but not erythematous and warm.  RESP: lungs clear to auscultation - no rales, rhonchi or wheezes  CV: regular rate and rhythm, normal S1 S2, no S3 or S4, no murmur, click or rub, no peripheral edema and peripheral pulses strong  ABDOMEN: soft, nontender, no hepatosplenomegaly, no masses and bowel sounds " normal  MS: no gross musculoskeletal defects noted, no edema

## 2023-06-14 LAB
ALBUMIN SERPL BCG-MCNC: 4.6 G/DL (ref 3.5–5.2)
ALP SERPL-CCNC: 75 U/L (ref 35–104)
ALT SERPL W P-5'-P-CCNC: 34 U/L (ref 0–50)
ANION GAP SERPL CALCULATED.3IONS-SCNC: 13 MMOL/L (ref 7–15)
AST SERPL W P-5'-P-CCNC: 35 U/L (ref 0–45)
BILIRUB SERPL-MCNC: 0.5 MG/DL
BUN SERPL-MCNC: 11.4 MG/DL (ref 8–23)
CALCIUM SERPL-MCNC: 9.7 MG/DL (ref 8.8–10.2)
CHLORIDE SERPL-SCNC: 105 MMOL/L (ref 98–107)
CHOLEST SERPL-MCNC: 245 MG/DL
CREAT SERPL-MCNC: 0.86 MG/DL (ref 0.51–0.95)
DEPRECATED HCO3 PLAS-SCNC: 24 MMOL/L (ref 22–29)
GFR SERPL CREATININE-BSD FRML MDRD: 70 ML/MIN/1.73M2
GLUCOSE SERPL-MCNC: 104 MG/DL (ref 70–99)
HDLC SERPL-MCNC: 60 MG/DL
LDLC SERPL CALC-MCNC: 144 MG/DL
NONHDLC SERPL-MCNC: 185 MG/DL
POTASSIUM SERPL-SCNC: 4.9 MMOL/L (ref 3.4–5.3)
PROT SERPL-MCNC: 7.5 G/DL (ref 6.4–8.3)
SODIUM SERPL-SCNC: 142 MMOL/L (ref 136–145)
TRIGL SERPL-MCNC: 207 MG/DL

## 2023-07-03 ENCOUNTER — PATIENT OUTREACH (OUTPATIENT)
Dept: CARE COORDINATION | Facility: CLINIC | Age: 78
End: 2023-07-03
Payer: COMMERCIAL

## 2023-07-31 ENCOUNTER — PATIENT OUTREACH (OUTPATIENT)
Dept: CARE COORDINATION | Facility: CLINIC | Age: 78
End: 2023-07-31
Payer: COMMERCIAL

## 2023-09-09 DIAGNOSIS — K21.00 GASTROESOPHAGEAL REFLUX DISEASE WITH ESOPHAGITIS, UNSPECIFIED WHETHER HEMORRHAGE: ICD-10-CM

## 2023-09-09 DIAGNOSIS — R05.3 CHRONIC COUGH: ICD-10-CM

## 2023-09-11 RX ORDER — PANTOPRAZOLE SODIUM 20 MG/1
TABLET, DELAYED RELEASE ORAL
Qty: 90 TABLET | Refills: 0 | Status: SHIPPED | OUTPATIENT
Start: 2023-09-11 | End: 2024-01-11

## 2023-10-24 ENCOUNTER — TELEPHONE (OUTPATIENT)
Dept: FAMILY MEDICINE | Facility: CLINIC | Age: 78
End: 2023-10-24
Payer: COMMERCIAL

## 2023-10-24 NOTE — TELEPHONE ENCOUNTER
Patient is calling and stated when she tried to call and schedule an appointment for her daughter to see Dr. Deloris MD, they informed her that PCP is not taking new patients.    Patient is calling and stated she believes that Dr. Deloris MD would be willing to see her daughter.  She is looking to schedule ASAP for hip pain and establish care.      Patient is requesting this message be sent to PCP high priority.  Please call patient after review, can leave a detailed message on answering machine.    Soni Zhang RN

## 2023-10-24 NOTE — TELEPHONE ENCOUNTER
Please add the patient on my schedule at any preferred time. Ok to use approval or eugene day slots. Thank you.  Radha Puentes MD on 10/24/2023 at 9:58 AM

## 2023-11-04 ENCOUNTER — HEALTH MAINTENANCE LETTER (OUTPATIENT)
Age: 78
End: 2023-11-04

## 2023-11-27 DIAGNOSIS — I10 BENIGN ESSENTIAL HYPERTENSION: ICD-10-CM

## 2023-11-27 RX ORDER — AMLODIPINE BESYLATE 5 MG/1
TABLET ORAL
Qty: 90 TABLET | Refills: 1 | Status: SHIPPED | OUTPATIENT
Start: 2023-11-27 | End: 2024-07-15

## 2024-01-11 DIAGNOSIS — K21.00 GASTROESOPHAGEAL REFLUX DISEASE WITH ESOPHAGITIS, UNSPECIFIED WHETHER HEMORRHAGE: ICD-10-CM

## 2024-01-11 DIAGNOSIS — R05.3 CHRONIC COUGH: ICD-10-CM

## 2024-01-11 RX ORDER — PANTOPRAZOLE SODIUM 20 MG/1
TABLET, DELAYED RELEASE ORAL
Qty: 90 TABLET | Refills: 0 | Status: SHIPPED | OUTPATIENT
Start: 2024-01-11 | End: 2024-05-06

## 2024-05-05 DIAGNOSIS — K21.00 GASTROESOPHAGEAL REFLUX DISEASE WITH ESOPHAGITIS, UNSPECIFIED WHETHER HEMORRHAGE: ICD-10-CM

## 2024-05-05 DIAGNOSIS — R05.3 CHRONIC COUGH: ICD-10-CM

## 2024-05-06 RX ORDER — PANTOPRAZOLE SODIUM 20 MG/1
TABLET, DELAYED RELEASE ORAL
Qty: 90 TABLET | Refills: 0 | Status: SHIPPED | OUTPATIENT
Start: 2024-05-06 | End: 2024-06-18

## 2024-06-18 ENCOUNTER — ANCILLARY PROCEDURE (OUTPATIENT)
Dept: GENERAL RADIOLOGY | Facility: CLINIC | Age: 79
End: 2024-06-18
Attending: FAMILY MEDICINE
Payer: COMMERCIAL

## 2024-06-18 ENCOUNTER — OFFICE VISIT (OUTPATIENT)
Dept: FAMILY MEDICINE | Facility: CLINIC | Age: 79
End: 2024-06-18
Payer: COMMERCIAL

## 2024-06-18 VITALS
BODY MASS INDEX: 28.14 KG/M2 | RESPIRATION RATE: 16 BRPM | WEIGHT: 185.7 LBS | HEART RATE: 54 BPM | HEIGHT: 68 IN | OXYGEN SATURATION: 96 % | TEMPERATURE: 98.4 F | DIASTOLIC BLOOD PRESSURE: 72 MMHG | SYSTOLIC BLOOD PRESSURE: 116 MMHG

## 2024-06-18 DIAGNOSIS — M54.12 CERVICAL RADICULOPATHY: ICD-10-CM

## 2024-06-18 DIAGNOSIS — E78.5 HYPERLIPIDEMIA LDL GOAL <130: ICD-10-CM

## 2024-06-18 DIAGNOSIS — Z12.31 VISIT FOR SCREENING MAMMOGRAM: ICD-10-CM

## 2024-06-18 DIAGNOSIS — R60.0 PEDAL EDEMA: ICD-10-CM

## 2024-06-18 DIAGNOSIS — S61.301A AVULSION OF NAIL OF LEFT INDEX FINGER: ICD-10-CM

## 2024-06-18 DIAGNOSIS — Z12.31 ENCOUNTER FOR SCREENING MAMMOGRAM FOR BREAST CANCER: ICD-10-CM

## 2024-06-18 DIAGNOSIS — R05.1 ACUTE COUGH: ICD-10-CM

## 2024-06-18 DIAGNOSIS — C50.912 MALIGNANT NEOPLASM OF LEFT BREAST IN FEMALE, ESTROGEN RECEPTOR POSITIVE, UNSPECIFIED SITE OF BREAST (H): ICD-10-CM

## 2024-06-18 DIAGNOSIS — I10 ESSENTIAL HYPERTENSION: ICD-10-CM

## 2024-06-18 DIAGNOSIS — R06.09 OTHER FORMS OF DYSPNEA: ICD-10-CM

## 2024-06-18 DIAGNOSIS — R59.1 LYMPHADENOPATHY: Primary | ICD-10-CM

## 2024-06-18 DIAGNOSIS — K21.9 GASTROESOPHAGEAL REFLUX DISEASE WITHOUT ESOPHAGITIS: ICD-10-CM

## 2024-06-18 DIAGNOSIS — Z17.0 MALIGNANT NEOPLASM OF LEFT BREAST IN FEMALE, ESTROGEN RECEPTOR POSITIVE, UNSPECIFIED SITE OF BREAST (H): ICD-10-CM

## 2024-06-18 LAB
ALBUMIN SERPL BCG-MCNC: 4.5 G/DL (ref 3.5–5.2)
ALP SERPL-CCNC: 68 U/L (ref 40–150)
ALT SERPL W P-5'-P-CCNC: 17 U/L (ref 0–50)
ANION GAP SERPL CALCULATED.3IONS-SCNC: 11 MMOL/L (ref 7–15)
AST SERPL W P-5'-P-CCNC: 22 U/L (ref 0–45)
BASOPHILS # BLD AUTO: 0.1 10E3/UL (ref 0–0.2)
BASOPHILS NFR BLD AUTO: 1 %
BILIRUB SERPL-MCNC: 0.5 MG/DL
BUN SERPL-MCNC: 12.6 MG/DL (ref 8–23)
CALCIUM SERPL-MCNC: 9.8 MG/DL (ref 8.8–10.2)
CHLORIDE SERPL-SCNC: 103 MMOL/L (ref 98–107)
CHOLEST SERPL-MCNC: 267 MG/DL
CREAT SERPL-MCNC: 0.86 MG/DL (ref 0.51–0.95)
DEPRECATED HCO3 PLAS-SCNC: 25 MMOL/L (ref 22–29)
EGFRCR SERPLBLD CKD-EPI 2021: 70 ML/MIN/1.73M2
EOSINOPHIL # BLD AUTO: 0.4 10E3/UL (ref 0–0.7)
EOSINOPHIL NFR BLD AUTO: 6 %
ERYTHROCYTE [DISTWIDTH] IN BLOOD BY AUTOMATED COUNT: 12.6 % (ref 10–15)
FASTING STATUS PATIENT QL REPORTED: YES
FASTING STATUS PATIENT QL REPORTED: YES
GLUCOSE SERPL-MCNC: 102 MG/DL (ref 70–99)
HCT VFR BLD AUTO: 44.8 % (ref 35–47)
HDLC SERPL-MCNC: 66 MG/DL
HGB BLD-MCNC: 14.5 G/DL (ref 11.7–15.7)
IMM GRANULOCYTES # BLD: 0 10E3/UL
IMM GRANULOCYTES NFR BLD: 0 %
LDH SERPL L TO P-CCNC: 205 U/L (ref 0–250)
LDLC SERPL CALC-MCNC: 166 MG/DL
LYMPHOCYTES # BLD AUTO: 2.2 10E3/UL (ref 0.8–5.3)
LYMPHOCYTES NFR BLD AUTO: 35 %
MCH RBC QN AUTO: 30.2 PG (ref 26.5–33)
MCHC RBC AUTO-ENTMCNC: 32.4 G/DL (ref 31.5–36.5)
MCV RBC AUTO: 93 FL (ref 78–100)
MONOCYTES # BLD AUTO: 0.6 10E3/UL (ref 0–1.3)
MONOCYTES NFR BLD AUTO: 9 %
NEUTROPHILS # BLD AUTO: 3.1 10E3/UL (ref 1.6–8.3)
NEUTROPHILS NFR BLD AUTO: 49 %
NONHDLC SERPL-MCNC: 201 MG/DL
NT-PROBNP SERPL-MCNC: <36 PG/ML (ref 0–1800)
PLATELET # BLD AUTO: 291 10E3/UL (ref 150–450)
POTASSIUM SERPL-SCNC: 4.8 MMOL/L (ref 3.4–5.3)
PROT SERPL-MCNC: 7.5 G/DL (ref 6.4–8.3)
RBC # BLD AUTO: 4.8 10E6/UL (ref 3.8–5.2)
SODIUM SERPL-SCNC: 139 MMOL/L (ref 135–145)
TRIGL SERPL-MCNC: 173 MG/DL
WBC # BLD AUTO: 6.3 10E3/UL (ref 4–11)

## 2024-06-18 PROCEDURE — 83880 ASSAY OF NATRIURETIC PEPTIDE: CPT | Performed by: FAMILY MEDICINE

## 2024-06-18 PROCEDURE — 72040 X-RAY EXAM NECK SPINE 2-3 VW: CPT | Mod: TC | Performed by: RADIOLOGY

## 2024-06-18 PROCEDURE — 99214 OFFICE O/P EST MOD 30 MIN: CPT | Performed by: FAMILY MEDICINE

## 2024-06-18 PROCEDURE — 36415 COLL VENOUS BLD VENIPUNCTURE: CPT | Performed by: FAMILY MEDICINE

## 2024-06-18 PROCEDURE — 85025 COMPLETE CBC W/AUTO DIFF WBC: CPT | Performed by: FAMILY MEDICINE

## 2024-06-18 PROCEDURE — 83615 LACTATE (LD) (LDH) ENZYME: CPT | Performed by: FAMILY MEDICINE

## 2024-06-18 PROCEDURE — 80061 LIPID PANEL: CPT | Performed by: FAMILY MEDICINE

## 2024-06-18 PROCEDURE — 80053 COMPREHEN METABOLIC PANEL: CPT | Performed by: FAMILY MEDICINE

## 2024-06-18 RX ORDER — METHYLPREDNISOLONE 4 MG
TABLET, DOSE PACK ORAL
Qty: 21 TABLET | Refills: 0 | Status: SHIPPED | OUTPATIENT
Start: 2024-06-18

## 2024-06-18 RX ORDER — PANTOPRAZOLE SODIUM 40 MG/1
40 TABLET, DELAYED RELEASE ORAL DAILY
Qty: 90 TABLET | Refills: 2 | Status: SHIPPED | OUTPATIENT
Start: 2024-06-18

## 2024-06-18 ASSESSMENT — PATIENT HEALTH QUESTIONNAIRE - PHQ9
SUM OF ALL RESPONSES TO PHQ QUESTIONS 1-9: 1
SUM OF ALL RESPONSES TO PHQ QUESTIONS 1-9: 1
10. IF YOU CHECKED OFF ANY PROBLEMS, HOW DIFFICULT HAVE THESE PROBLEMS MADE IT FOR YOU TO DO YOUR WORK, TAKE CARE OF THINGS AT HOME, OR GET ALONG WITH OTHER PEOPLE: SOMEWHAT DIFFICULT

## 2024-06-18 ASSESSMENT — ENCOUNTER SYMPTOMS: COUGH: 1

## 2024-06-18 NOTE — PROGRESS NOTES
"  Assessment & Plan     Lymphadenopathy  - US Head Neck Soft Tissue; Future  - CBC with platelets and differential; Future  - Lactate Dehydrogenase; Future  - CBC with platelets and differential  - Lactate Dehydrogenase    Cervical radiculopathy  - XR Cervical Spine 2/3 Views; Future  - Physical Therapy  Referral; Future  - methylPREDNISolone (MEDROL DOSEPAK) 4 MG tablet therapy pack; Follow Package Directions    Gastroesophageal reflux disease without esophagitis  - pantoprazole (PROTONIX) 40 MG EC tablet; Take 1 tablet (40 mg) by mouth daily    Hyperlipidemia LDL goal <130  - Lipid panel reflex to direct LDL Fasting; Future  - Lipid panel reflex to direct LDL Fasting    Visit for screening mammogram  1999- lumpectomy right breast followed by radiation for 6 weeks.   Left breast mastectomy 2 years. Has not followed with oncolologist.    Avulsion of nail of left index finger  Traumatic, reassurance given to the patient.    Pedal edema  Advised to elevate extremity  Wear compression stockings  Continue amlodipine although this may be a cause  No cardiac symptoms or symptoms for HF  - BNP-N terminal pro; Future  - BNP-N terminal pro    Essential hypertension  - Comprehensive metabolic panel    Encounter for screening mammogram for breast cancer  - MA Screening Bilateral w/ [facility]; Future    Acute cough  - BNP-N terminal pro; Future  - BNP-N terminal pro  - General PFT Lab (Please always keep checked); Future        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, estrogen receptor positive, unspecified site of breast (H)            BMI  Estimated body mass index is 28.24 kg/m  as calculated from the following:    Height as of this encounter: 1.727 m (5' 8\").    Weight as of this encounter: 84.2 kg (185 lb 11.2 oz).   Weight management plan: Discussed healthy diet and exercise guidelines          Heidy Humphreys is a 78 year old, presenting for the following health issues:  Foot Swelling, Cough, and Arm Pain      " "6/18/2024     8:38 AM   Additional Questions   Roomed by Yoselyn MAK CMA   Accompanied by self     Left index finger  Left arm pain radiating to left 5th finger and wrist  Dentist noticed a small lump at the left side of her neck.  Pain History:  When did you first notice your pain?  4 weeks   Have you seen anyone else for your pain? No  How has your pain affected your ability to work? Not applicable  Where in your body do you have pain? Musculoskeletal problem/pain  Onset/Duration: 4 weeks or more   Description  Location: foot - left and right, as well as LEFT arm  Joint Swelling: YES  Redness: No  Pain: YES  Warmth: No  Intensity:  moderate  Progression of Symptoms:  worsening and same  Accompanying signs and symptoms:   Fevers: No  Numbness/tingling/weakness: YES  History  Trauma to the area: No  Recent illness:  No  Previous similar problem: YES  Previous evaluation:  YES  Precipitating or alleviating factors:  Aggravating factors include: unsure   Therapies tried and outcome: Ibuprofen            Objective    /72   Pulse 54   Temp 98.4  F (36.9  C) (Temporal)   Resp 16   Ht 1.727 m (5' 8\")   Wt 84.2 kg (185 lb 11.2 oz)   LMP 11/10/1986 (Exact Date)   SpO2 96%   BMI 28.24 kg/m    Body mass index is 28.24 kg/m .  Physical Exam  HENT:      Head: Normocephalic.      Right Ear: Tympanic membrane normal.      Left Ear: Tympanic membrane normal.   Eyes:      Pupils: Pupils are equal, round, and reactive to light.   Neck:      Comments: Very prominent left salivary gland.  Cardiovascular:      Rate and Rhythm: Normal rate and regular rhythm.      Comments: No pedal edema  Pulmonary:      Effort: Pulmonary effort is normal.   Abdominal:      Palpations: Abdomen is soft.   Musculoskeletal:      Right shoulder: Normal.      Left shoulder: Normal.      Cervical back: Neck supple. Spasms present. Decreased range of motion.   Lymphadenopathy:      Cervical: No cervical adenopathy.   Neurological:      General: No " focal deficit present.      Mental Status: She is alert.   Psychiatric:         Mood and Affect: Mood normal.                    Signed Electronically by: Radha Puentes MD    Answers submitted by the patient for this visit:  Patient Health Questionnaire (Submitted on 6/18/2024)  If you checked off any problems, how difficult have these problems made it for you to do your work, take care of things at home, or get along with other people?: Somewhat difficult  PHQ9 TOTAL SCORE: 1  General Questionnaire (Submitted on 6/15/2024)  Chief Complaint: Chronic problems general questions HPI Form  How many minutes a day do you exercise enough to make your heart beat faster?: 30 to 60  How many days a week do you exercise enough to make your heart beat faster?: 4  How many days per week do you miss taking your medication?: 0  General Concern (Submitted on 6/15/2024)  Chief Complaint: Chronic problems general questions HPI Form  What is the reason for your visit today?: Cough and more  When did your symptoms begin?: 3-4 weeks ago  How would you describe these symptoms?: Moderate  Are your symptoms:: Staying the same  Have you had these symptoms before?: Yes  Have you tried or received treatment for these symptoms before?: Yes  Did that treatment work? : No

## 2024-06-18 NOTE — PATIENT INSTRUCTIONS
Elevated your legs after a flight or standing or sitting too long  Wear compression stockings during heavy stockings  Cut back on salt.

## 2024-07-13 DIAGNOSIS — I10 BENIGN ESSENTIAL HYPERTENSION: ICD-10-CM

## 2024-07-15 RX ORDER — AMLODIPINE BESYLATE 5 MG/1
TABLET ORAL
Qty: 90 TABLET | Refills: 0 | Status: SHIPPED | OUTPATIENT
Start: 2024-07-15

## 2024-08-10 ENCOUNTER — HEALTH MAINTENANCE LETTER (OUTPATIENT)
Age: 79
End: 2024-08-10

## 2024-10-16 DIAGNOSIS — I10 BENIGN ESSENTIAL HYPERTENSION: ICD-10-CM

## 2024-10-16 RX ORDER — AMLODIPINE BESYLATE 5 MG/1
5 TABLET ORAL DAILY
Qty: 90 TABLET | Refills: 0 | Status: SHIPPED | OUTPATIENT
Start: 2024-10-16